# Patient Record
Sex: FEMALE | Employment: FULL TIME | URBAN - METROPOLITAN AREA
[De-identification: names, ages, dates, MRNs, and addresses within clinical notes are randomized per-mention and may not be internally consistent; named-entity substitution may affect disease eponyms.]

---

## 2017-02-06 PROCEDURE — 87591 N.GONORRHOEAE DNA AMP PROB: CPT

## 2017-02-06 PROCEDURE — 87491 CHLMYD TRACH DNA AMP PROBE: CPT

## 2017-02-07 ENCOUNTER — APPOINTMENT (OUTPATIENT)
Dept: LAB | Age: 22
End: 2017-02-07
Attending: OBSTETRICS & GYNECOLOGY

## 2017-02-07 DIAGNOSIS — Z11.3 SCREENING FOR VENEREAL DISEASE: ICD-10-CM

## 2017-02-08 LAB
C TRACH DNA SPEC QL NAA+PROBE: NEGATIVE
N GONORRHOEA DNA SPEC QL NAA+PROBE: NEGATIVE

## 2017-03-14 ENCOUNTER — TELEPHONE (OUTPATIENT)
Dept: OBGYN CLINIC | Facility: CLINIC | Age: 22
End: 2017-03-14

## 2017-03-14 DIAGNOSIS — N92.1 MENORRHAGIA WITH IRREGULAR CYCLE: ICD-10-CM

## 2017-03-14 DIAGNOSIS — E05.90 HYPERTHYROIDISM: Primary | ICD-10-CM

## 2017-03-15 ENCOUNTER — LAB ENCOUNTER (OUTPATIENT)
Dept: LAB | Age: 22
End: 2017-03-15
Attending: OBSTETRICS & GYNECOLOGY
Payer: COMMERCIAL

## 2017-03-15 DIAGNOSIS — N92.1 MENORRHAGIA WITH IRREGULAR CYCLE: ICD-10-CM

## 2017-03-15 DIAGNOSIS — E05.90 HYPERTHYROIDISM: ICD-10-CM

## 2017-03-15 LAB
BASOPHILS # BLD AUTO: 0.05 X10(3) UL (ref 0–0.1)
BASOPHILS NFR BLD AUTO: 0.9 %
EOSINOPHIL # BLD AUTO: 0.15 X10(3) UL (ref 0–0.3)
EOSINOPHIL NFR BLD AUTO: 2.8 %
ERYTHROCYTE [DISTWIDTH] IN BLOOD BY AUTOMATED COUNT: 12.1 % (ref 11.5–16)
HCT VFR BLD AUTO: 36.9 % (ref 34–50)
HGB BLD-MCNC: 12.4 G/DL (ref 12–16)
IMMATURE GRANULOCYTE COUNT: 0.01 X10(3) UL (ref 0–1)
IMMATURE GRANULOCYTE RATIO %: 0.2 %
LYMPHOCYTES # BLD AUTO: 1.74 X10(3) UL (ref 0.9–4)
LYMPHOCYTES NFR BLD AUTO: 32.2 %
MCH RBC QN AUTO: 30.8 PG (ref 27–33.2)
MCHC RBC AUTO-ENTMCNC: 33.6 G/DL (ref 31–37)
MCV RBC AUTO: 91.6 FL (ref 81–100)
MONOCYTES # BLD AUTO: 0.48 X10(3) UL (ref 0.1–0.6)
MONOCYTES NFR BLD AUTO: 8.9 %
NEUTROPHIL ABS PRELIM: 2.97 X10 (3) UL (ref 1.3–6.7)
NEUTROPHILS # BLD AUTO: 2.97 X10(3) UL (ref 1.3–6.7)
NEUTROPHILS NFR BLD AUTO: 55 %
PLATELET # BLD AUTO: 317 10(3)UL (ref 150–450)
RBC # BLD AUTO: 4.03 X10(6)UL (ref 3.8–5.1)
RED CELL DISTRIBUTION WIDTH-SD: 40.7 FL (ref 35.1–46.3)
TSI SER-ACNC: 0.6 MIU/ML (ref 0.35–5.5)
WBC # BLD AUTO: 5.4 X10(3) UL (ref 4–13)

## 2017-03-15 PROCEDURE — 84443 ASSAY THYROID STIM HORMONE: CPT

## 2017-03-15 PROCEDURE — 85025 COMPLETE CBC W/AUTO DIFF WBC: CPT

## 2017-05-24 ENCOUNTER — OFFICE VISIT (OUTPATIENT)
Dept: NEUROLOGY | Facility: CLINIC | Age: 22
End: 2017-05-24

## 2017-05-24 VITALS
HEIGHT: 69 IN | RESPIRATION RATE: 16 BRPM | BODY MASS INDEX: 20.73 KG/M2 | WEIGHT: 140 LBS | SYSTOLIC BLOOD PRESSURE: 96 MMHG | DIASTOLIC BLOOD PRESSURE: 64 MMHG | HEART RATE: 65 BPM

## 2017-05-24 DIAGNOSIS — G43.009 MIGRAINE WITHOUT AURA, NOT INTRACTABLE, WITHOUT STATUS MIGRAINOSUS: Primary | ICD-10-CM

## 2017-05-24 PROCEDURE — 99205 OFFICE O/P NEW HI 60 MIN: CPT | Performed by: OTHER

## 2017-05-24 RX ORDER — RANITIDINE 150 MG/1
1 TABLET ORAL DAILY
Refills: 3 | COMMUNITY
Start: 2017-05-12 | End: 2017-07-11

## 2017-05-24 RX ORDER — FROVATRIPTAN SUCCINATE 2.5 MG/1
2.5 TABLET, FILM COATED ORAL AS NEEDED
Qty: 12 TABLET | Refills: 0 | Status: SHIPPED | OUTPATIENT
Start: 2017-05-24 | End: 2017-06-23

## 2017-05-24 RX ORDER — NORETHINDRONE AND ETHINYL ESTRADIOL 1 MG-35MCG
1 KIT ORAL DAILY
Refills: 4 | COMMUNITY
Start: 2017-05-10 | End: 2017-11-28

## 2017-05-24 RX ORDER — DICYCLOMINE HCL 20 MG
1 TABLET ORAL DAILY
Refills: 3 | COMMUNITY
Start: 2017-05-04 | End: 2017-07-11

## 2017-05-24 RX ORDER — NORTRIPTYLINE HYDROCHLORIDE 25 MG/1
25 CAPSULE ORAL NIGHTLY
Qty: 30 CAPSULE | Refills: 1 | Status: SHIPPED | OUTPATIENT
Start: 2017-05-24 | End: 2017-07-08

## 2017-05-24 RX ORDER — IBUPROFEN 200 MG
200 TABLET ORAL EVERY 6 HOURS PRN
COMMUNITY
End: 2019-02-01 | Stop reason: ALTCHOICE

## 2017-05-24 NOTE — PATIENT INSTRUCTIONS
Refill policies:    • Allow 2-3 business days for refills; controlled substances may take longer.   • Contact your pharmacy at least 5 days prior to running out of medication and have them send an electronic request or submit request through the [facility] have a procedure or additional testing performed. [facility]) will contact your insurance carrier to obtain pre-certification or prior authorization.     Unfortunately, [facility] has seen an increase in denial of payment even though the p

## 2017-05-24 NOTE — PROGRESS NOTES
Neurology H&P    Al Soares Patient Status:  No patient class for patient encounter    1995 MRN [id number]   Location [address] [facility], [address] [place] [address] Drive, [address] [facility] Attending No att. providers found   [facility] Day #  PCP Pamela Patel for Pain. Disp:  Rfl:        Problem List:  There is no problem list on file for this patient.       PMHx:  Past Medical History   Diagnosis Date   • Migraines    • GERD (gastroesophageal reflux disease)    • Seasonal allergies        PSHx:  No past surgica strength throughout in UEs and LEs     SENSORY EXAMINATION:  UE: intact to light touch, pinprick is intact  LE: intact to light touch, pinprick is intact    COORDINATION:  No dysmetria, or intention tremors; normal MONA    REFLEXES: 2+ at biceps, 2+ triceps

## 2017-06-01 ENCOUNTER — HOSPITAL ENCOUNTER (OUTPATIENT)
Dept: MRI IMAGING | Facility: HOSPITAL | Age: 22
Discharge: HOME | End: 2017-06-01
Attending: STUDENT IN AN ORGANIZED HEALTH CARE EDUCATION/TRAINING PROGRAM
Payer: COMMERCIAL

## 2017-06-01 DIAGNOSIS — K59.00 CONSTIPATION, UNSPECIFIED CONSTIPATION TYPE: ICD-10-CM

## 2017-06-01 DIAGNOSIS — R10.84 ABDOMINAL PAIN, GENERALIZED: ICD-10-CM

## 2017-06-01 PROCEDURE — A9575 INJ GADOTERATE MEGLUMI 0.1ML: HCPCS | Performed by: STUDENT IN AN ORGANIZED HEALTH CARE EDUCATION/TRAINING PROGRAM

## 2017-06-01 PROCEDURE — 74183 MRI ABD W/O CNTR FLWD CNTR: CPT | Performed by: STUDENT IN AN ORGANIZED HEALTH CARE EDUCATION/TRAINING PROGRAM

## 2017-06-01 PROCEDURE — 72197 MRI PELVIS W/O & W/DYE: CPT | Performed by: STUDENT IN AN ORGANIZED HEALTH CARE EDUCATION/TRAINING PROGRAM

## 2017-06-12 ENCOUNTER — TELEPHONE (OUTPATIENT)
Dept: NEUROLOGY | Facility: CLINIC | Age: 22
End: 2017-06-12

## 2017-06-12 NOTE — TELEPHONE ENCOUNTER
Pts mother calling (OK per HIPAA) to state that the Frovatriptan ordered at [address] [place] [address] on 5/30/17 was never picked up because the [facility] AND [facility] cost is too expensive. She is requesting a different medication be ordered.   Confirmed preferred pharmacy of [facility]

## 2017-06-13 NOTE — TELEPHONE ENCOUNTER
I called Ms. Azar Martini on the phone regarding her Frovatriptan. She did not answer and I left a message on her personal VM to call back to clinic with questions.  She can continue to take sumatriptan for now if this is helping with her migraines and do not f

## 2017-07-08 DIAGNOSIS — G43.009 MIGRAINE WITHOUT AURA, NOT INTRACTABLE, WITHOUT STATUS MIGRAINOSUS: ICD-10-CM

## 2017-07-10 RX ORDER — NORTRIPTYLINE HYDROCHLORIDE 25 MG/1
CAPSULE ORAL
Qty: 30 CAPSULE | Refills: 0 | Status: SHIPPED | OUTPATIENT
Start: 2017-07-10 | End: 2017-10-16

## 2017-07-10 NOTE — TELEPHONE ENCOUNTER
Left message on patient identified voicemail (ok with HIPPA consent) informing patient she is due for follow up end of this month and only 30 day supply will be refilled until she is seen.    Medication: Pamelor    Date of last refill: 5/24/17  Date last fi

## 2017-08-23 ENCOUNTER — TELEPHONE (OUTPATIENT)
Dept: NEUROLOGY | Facility: CLINIC | Age: 22
End: 2017-08-23

## 2017-08-23 RX ORDER — SUMATRIPTAN 25 MG/1
25 TABLET, FILM COATED ORAL EVERY 2 HOUR PRN
Qty: 9 TABLET | Refills: 0 | Status: SHIPPED | OUTPATIENT
Start: 2017-08-23 | End: 2017-09-15

## 2017-08-23 NOTE — TELEPHONE ENCOUNTER
Sumatriptan sent to pharmacy. We can discuss further prophylactic medications at her next visit if she would like.

## 2017-08-23 NOTE — TELEPHONE ENCOUNTER
Per Dr Angel Pina 5/24/17:   Assessment:   This is a 24 y/o female with 4 years of migraine type headaches, I will start Nortriptyline 25mg PO QHS for prophylaxis and Frovatriptan for breakthrough headache especially as she notes longer headache around her me

## 2017-08-23 NOTE — TELEPHONE ENCOUNTER
Pt returning call. Relayed message from Dr. Radha Root. Educated patient on use of Imitrex and MOH. Pt verbalized understanding, agrees to plan, and expresses intent to comply with recommendations given. Pt agreeable to scheduling a f/u appt.   Accepted

## 2017-08-23 NOTE — TELEPHONE ENCOUNTER
Attempted to reach patient. Unable to leave a message at this time. When returns call please help patient schedule f/u appt with Dr Stephanie Loyd and relay recommendations below. Medication sent to pharmacy with receipt confirmation.

## 2017-08-23 NOTE — TELEPHONE ENCOUNTER
Spoke with patient. She states she has been having headaches everyday and migraines once a week. Patient stated Nortriptyline 25 mg is not helping and she  stopped taking it a week ago. She also stated Frovatriptan does not work.      Patient said she t

## 2017-09-07 ENCOUNTER — TELEPHONE (OUTPATIENT)
Dept: NEUROLOGY | Facility: CLINIC | Age: 22
End: 2017-09-07

## 2017-09-15 DIAGNOSIS — G43.009 MIGRAINE WITHOUT AURA, NOT INTRACTABLE, WITHOUT STATUS MIGRAINOSUS: Primary | ICD-10-CM

## 2017-09-15 RX ORDER — SUMATRIPTAN 25 MG/1
TABLET, FILM COATED ORAL
Qty: 9 TABLET | Refills: 0 | Status: SHIPPED | OUTPATIENT
Start: 2017-09-15 | End: 2017-10-16

## 2017-09-15 NOTE — TELEPHONE ENCOUNTER
Medication: Imitrex    Date of last refill: 8/23/17 for #9/0 additional refills  [place]    Last office visit: 5/24/17  Due back to clinic per last office note:  2 months  Date next office visit scheduled:  10/16/17

## 2017-10-16 ENCOUNTER — APPOINTMENT (OUTPATIENT)
Dept: LAB | Facility: HOSPITAL | Age: 22
End: 2017-10-16
Attending: Other
Payer: COMMERCIAL

## 2017-10-16 ENCOUNTER — OFFICE VISIT (OUTPATIENT)
Dept: NEUROLOGY | Facility: CLINIC | Age: 22
End: 2017-10-16

## 2017-10-16 VITALS
DIASTOLIC BLOOD PRESSURE: 58 MMHG | SYSTOLIC BLOOD PRESSURE: 112 MMHG | RESPIRATION RATE: 16 BRPM | HEART RATE: 80 BPM | BODY MASS INDEX: 21 KG/M2 | WEIGHT: 144 LBS

## 2017-10-16 DIAGNOSIS — F41.9 ANXIETY: ICD-10-CM

## 2017-10-16 DIAGNOSIS — G43.009 MIGRAINE WITHOUT AURA, NOT INTRACTABLE, WITHOUT STATUS MIGRAINOSUS: ICD-10-CM

## 2017-10-16 DIAGNOSIS — G43.009 MIGRAINE WITHOUT AURA, NOT INTRACTABLE, WITHOUT STATUS MIGRAINOSUS: Primary | ICD-10-CM

## 2017-10-16 PROCEDURE — 85027 COMPLETE CBC AUTOMATED: CPT

## 2017-10-16 PROCEDURE — 99214 OFFICE O/P EST MOD 30 MIN: CPT | Performed by: OTHER

## 2017-10-16 PROCEDURE — 80053 COMPREHEN METABOLIC PANEL: CPT

## 2017-10-16 PROCEDURE — 36415 COLL VENOUS BLD VENIPUNCTURE: CPT

## 2017-10-16 RX ORDER — VENLAFAXINE 37.5 MG/1
37.5 TABLET ORAL DAILY
Qty: 30 TABLET | Refills: 0 | Status: SHIPPED | OUTPATIENT
Start: 2017-10-16 | End: 2017-11-29

## 2017-10-16 RX ORDER — VENLAFAXINE 37.5 MG/1
TABLET ORAL
Qty: 90 TABLET | Refills: 0 | OUTPATIENT
Start: 2017-10-16

## 2017-10-16 RX ORDER — SUMATRIPTAN 100 MG/1
50 TABLET, FILM COATED ORAL EVERY 2 HOUR PRN
Qty: 9 TABLET | Refills: 0 | Status: SHIPPED | OUTPATIENT
Start: 2017-10-16 | End: 2018-01-15

## 2017-10-16 NOTE — PROGRESS NOTES
Neurology H&P    Mariya Velasco Patient Status:  No patient class for patient encounter    1995 MRN [id number]   Location [address] [place] [address], [address] [place] [address] Drive, [address] [facility] Attending No att. providers found   [facility] Day # 0 PCP No primary care pr migraine headaches. She was only prescribed 25mg but states that it did not significantly help so started taking her mothers medications. She was cautioned against using other peoples medications today. She takes APAP 2-3 times per week.  She feels that The Reynaldo loss  Vision: no vision changes, no new blurry or double vision  Head and Neck: no eye or ear discharge  Pulmonary: no SOB, no new cough  CV: no chest pain, no new lower extremity swelling  GI: no constipation or abdominal pain  : denies frequent urinati as it may interfere with her OCP. She also declines further trial of a TCA. I weill try a low dose of venlafaxine for both her anxiety and headaches.  We discussed possible side effects in detail and the dangers of becoming pregnant on this medication and s

## 2017-10-16 NOTE — PATIENT INSTRUCTIONS
Refill policies:    • Allow 2-3 business days for refills; controlled substances may take longer.   • Contact your pharmacy at least 5 days prior to running out of medication and have them send an electronic request or submit request through the [facility] have a procedure or additional testing performed. [facility]) will contact your insurance carrier to obtain pre-certification or prior authorization.     Unfortunately, [facility] has seen an increase in denial of payment even though the p

## 2017-10-16 NOTE — PROGRESS NOTES
Patient states she started taking her mothers Nortriptyline 100 mg for the past month because the 25 mg prescribed was not helping.  Educated patient on taking medications prescribed specially for her and to inform the office if she feels she needs a dose a

## 2017-10-17 PROBLEM — F41.9 ANXIETY: Status: ACTIVE | Noted: 2017-10-17

## 2017-11-01 ENCOUNTER — TELEPHONE (OUTPATIENT)
Dept: NEUROLOGY | Facility: CLINIC | Age: 22
End: 2017-11-01

## 2017-11-01 NOTE — TELEPHONE ENCOUNTER
Patient calling with condition update after starting Venlafaxine. Started this morning, first dose at 1AM. Did eat prior but at 11PM. States she woke up every 30 minutes feeling very nauseous. Still feeling a little nauseous today.  Will get back to pat

## 2017-11-01 NOTE — TELEPHONE ENCOUNTER
Relayed Dr. Tigist Beal recommendations. Verbalized understanding. No further questions at this time.

## 2017-11-28 ENCOUNTER — OFFICE VISIT (OUTPATIENT)
Dept: OBGYN CLINIC | Facility: CLINIC | Age: 22
End: 2017-11-28

## 2017-11-28 VITALS
HEIGHT: 69 IN | BODY MASS INDEX: 21.18 KG/M2 | SYSTOLIC BLOOD PRESSURE: 102 MMHG | DIASTOLIC BLOOD PRESSURE: 52 MMHG | HEART RATE: 80 BPM | WEIGHT: 143 LBS

## 2017-11-28 DIAGNOSIS — Z01.419 ENCOUNTER FOR WELL WOMAN EXAM WITH ROUTINE GYNECOLOGICAL EXAM: Primary | ICD-10-CM

## 2017-11-28 PROCEDURE — 87591 N.GONORRHOEAE DNA AMP PROB: CPT | Performed by: OBSTETRICS & GYNECOLOGY

## 2017-11-28 PROCEDURE — 87491 CHLMYD TRACH DNA AMP PROBE: CPT | Performed by: OBSTETRICS & GYNECOLOGY

## 2017-11-28 PROCEDURE — 99395 PREV VISIT EST AGE 18-39: CPT | Performed by: OBSTETRICS & GYNECOLOGY

## 2017-11-28 RX ORDER — NORETHINDRONE AND ETHINYL ESTRADIOL 1 MG-35MCG
1 KIT ORAL DAILY
Qty: 3 PACKAGE | Refills: 3 | Status: SHIPPED | OUTPATIENT
Start: 2017-11-28 | End: 2019-02-01 | Stop reason: ALTCHOICE

## 2017-11-28 NOTE — PROGRESS NOTES
Isis Fermin is a 23year old female [facility] Patient's last menstrual period was 11/16/2017 (exact date).  Patient presents with:  Wellness Visit: pt states urine has a foul odor  Patient has no complaints, sexually active    OBSTETRICS HISTORY:  OB Sodium 10 MG Oral Tab, Take 1 tablet (10 mg total) by mouth nightly.  1 tab po qd, Disp: 30 tablet, Rfl: 11  •  Albuterol Sulfate HFA (PROAIR HFA) 108 (90 Base) MCG/ACT Inhalation Aero Soln, 1-2 puffs every 4-6 hours as needed, Disp: 1 Inhaler, Rfl: 6  •  i abnormal discharge  Cervix:  Normal without tenderness on motion  Uterus: normal in size, contour, position, mobility, without tenderness  Adnexa: normal without masses or tenderness  Perineum: normal  Anus: no hemorroids     Assessment & Plan:  Diagnoses

## 2017-11-29 DIAGNOSIS — G43.009 MIGRAINE WITHOUT AURA, NOT INTRACTABLE, WITHOUT STATUS MIGRAINOSUS: ICD-10-CM

## 2017-11-29 RX ORDER — VENLAFAXINE 37.5 MG/1
TABLET ORAL
Qty: 30 TABLET | Refills: 1 | Status: SHIPPED | OUTPATIENT
Start: 2017-11-29 | End: 2018-01-15

## 2017-11-29 NOTE — TELEPHONE ENCOUNTER
Medication: Effexor 37.5mg     Date of last refill: 10/16/17  [place]     Last office visit: 10/16/17  Due back to clinic per last office note:  4 weeks  Date next office visit scheduled:  1/15/18    Last OV note recomme

## 2017-12-01 ENCOUNTER — TELEPHONE (OUTPATIENT)
Dept: NEUROLOGY | Facility: CLINIC | Age: 22
End: 2017-12-01

## 2018-01-15 ENCOUNTER — OFFICE VISIT (OUTPATIENT)
Dept: NEUROLOGY | Facility: CLINIC | Age: 23
End: 2018-01-15

## 2018-01-15 VITALS
DIASTOLIC BLOOD PRESSURE: 56 MMHG | SYSTOLIC BLOOD PRESSURE: 102 MMHG | WEIGHT: 143 LBS | BODY MASS INDEX: 21.18 KG/M2 | HEIGHT: 69 IN | RESPIRATION RATE: 16 BRPM | HEART RATE: 90 BPM

## 2018-01-15 DIAGNOSIS — G43.009 MIGRAINE WITHOUT AURA, NOT INTRACTABLE, WITHOUT STATUS MIGRAINOSUS: Primary | ICD-10-CM

## 2018-01-15 DIAGNOSIS — F41.9 ANXIETY: ICD-10-CM

## 2018-01-15 PROCEDURE — 99213 OFFICE O/P EST LOW 20 MIN: CPT | Performed by: OTHER

## 2018-01-15 RX ORDER — VENLAFAXINE 37.5 MG/1
37.5 TABLET ORAL
Qty: 30 TABLET | Refills: 6 | Status: SHIPPED | OUTPATIENT
Start: 2018-01-15 | End: 2018-09-09

## 2018-01-15 RX ORDER — SUMATRIPTAN 100 MG/1
50 TABLET, FILM COATED ORAL EVERY 2 HOUR PRN
Qty: 9 TABLET | Refills: 0 | Status: SHIPPED | OUTPATIENT
Start: 2018-01-15 | End: 2018-09-25

## 2018-01-15 NOTE — PROGRESS NOTES
Neurology H&P    Almetta Buerger Patient Status:  No patient class for patient encounter    1995 MRN [id number]   Location ED [facility], [address], [address] [facility] [address] Attending No att. providers found   [facility] Day # 0 PCP No primary care pr completely relieved her headaches. She reports less anxiety now as well. She does note that she stopped using her perfume and this helped significantly. She also notes more headaches if she sleeps in an odd position.     Current Medications:    Current Outp no eye or ear discharge  Pulmonary: no SOB, no new cough  CV: no chest pain, no new lower extremity swelling  GI: no constipation or abdominal pain  : denies frequent urination or difficulty urinating   Heme: no new bruising, no unexplained fevers or chi control. Plan:  1. Headache - Migraine w/o maris and medication overuse headache  - She is hesitant to start a medication such as Verapamil or propranolol due to low BP concerns for dizziness etc  - Continue Venlafaxine 37.5mg.  She was explicitly warned

## 2018-01-15 NOTE — PATIENT INSTRUCTIONS
Refill policies:    • Allow 2-3 business days for refills; controlled substances may take longer.   • Contact your pharmacy at least 5 days prior to running out of medication and have them send an electronic request or submit request through the [facility] recommended that you have a procedure or additional testing performed. [facility]) will contact your insurance carrier to obtain pre-certification or prior authorization.     Unfortunately, [facility] has seen an increase in denial of paym

## 2018-09-09 DIAGNOSIS — G43.009 MIGRAINE WITHOUT AURA, NOT INTRACTABLE, WITHOUT STATUS MIGRAINOSUS: ICD-10-CM

## 2018-09-09 DIAGNOSIS — F41.9 ANXIETY: ICD-10-CM

## 2018-09-11 NOTE — TELEPHONE ENCOUNTER
Left message on patient identified voicemail (ok with HIPPA consent) informing patient  Needs appointment for further refills.   .  Medication: Venlafaxine    Date of last refill: 1/15/18 (#30/6)  [place]    Last offic

## 2018-09-11 NOTE — TELEPHONE ENCOUNTER
Patient called and made appointment with Dr. Ector Clay on 9/25/18. She was informed that the medication would only be filled up until that date. She is completely out of the medication.

## 2018-09-12 RX ORDER — VENLAFAXINE 37.5 MG/1
TABLET ORAL
Qty: 30 TABLET | Refills: 0 | Status: SHIPPED | OUTPATIENT
Start: 2018-09-12 | End: 2018-09-25

## 2018-09-25 ENCOUNTER — OFFICE VISIT (OUTPATIENT)
Dept: NEUROLOGY | Facility: CLINIC | Age: 23
End: 2018-09-25
Payer: COMMERCIAL

## 2018-09-25 VITALS
SYSTOLIC BLOOD PRESSURE: 90 MMHG | BODY MASS INDEX: 21 KG/M2 | HEART RATE: 64 BPM | WEIGHT: 144 LBS | DIASTOLIC BLOOD PRESSURE: 70 MMHG

## 2018-09-25 DIAGNOSIS — G43.009 MIGRAINE WITHOUT AURA, NOT INTRACTABLE, WITHOUT STATUS MIGRAINOSUS: Primary | ICD-10-CM

## 2018-09-25 DIAGNOSIS — F41.9 ANXIETY: ICD-10-CM

## 2018-09-25 PROCEDURE — 99213 OFFICE O/P EST LOW 20 MIN: CPT | Performed by: OTHER

## 2018-09-25 RX ORDER — VENLAFAXINE 37.5 MG/1
TABLET ORAL
Qty: 90 TABLET | Refills: 2 | Status: SHIPPED | OUTPATIENT
Start: 2018-09-25 | End: 2019-07-10

## 2018-09-25 RX ORDER — SUMATRIPTAN 100 MG/1
50 TABLET, FILM COATED ORAL EVERY 2 HOUR PRN
Qty: 9 TABLET | Refills: 3 | Status: SHIPPED | OUTPATIENT
Start: 2018-09-25

## 2018-09-25 NOTE — PATIENT INSTRUCTIONS
Refill policies:    • Allow 2-3 business days for refills; controlled substances may take longer.   • Contact your pharmacy at least 5 days prior to running out of medication and have them send an electronic request or submit request through the “request re entire amount billed. Precertification and Prior Authorizations: If your physician has recommended that you have a procedure or additional testing performed.   [facility]) will contact your insurance carrier to obtain pre-certi

## 2018-09-25 NOTE — PROGRESS NOTES
Neurology H&P    Violet Smith Patient Status:  No patient class for patient encounter    1995 MRN [id number]   Location [facility], [address], [address] Attending No att. providers found   [facility] Day # 0 PCP No primary care pr sumatriptan but otherwise feels that headaches are under very good control. Current Medications:    Current Outpatient Medications:  Multiple Vitamins-Minerals (MULTIVITAMIN ADULT OR) Take by mouth daily.  Disp:  Rfl:    VENLAFAXINE HCL 37.5 MG Oral Tab difficulty urinating   Heme: no new bruising, no unexplained fevers or chills  Endocrine: no unexplained weight loss or gain, no new fatigue  Musculoskeletal: denies back pain, denies joint pain  Psych: denies depression   Skin: denies any new masses, rash warned about risk of venlafaxine in pregnancy.        - We discussed possible side effects in detail      - Counseled to take OTC analgesics no more than 2-3 days per week  - Exercise 3 times per week at least 30 minutes  - Caffeine only in moderation  - En

## 2018-12-23 RX ORDER — NORETHINDRONE AND ETHINYL ESTRADIOL 1 MG-35MCG
KIT ORAL
Qty: 84 TABLET | Refills: 0 | OUTPATIENT
Start: 2018-12-23

## 2019-02-01 ENCOUNTER — OFFICE VISIT (OUTPATIENT)
Dept: OBGYN CLINIC | Facility: CLINIC | Age: 24
End: 2019-02-01
Payer: COMMERCIAL

## 2019-02-01 VITALS
HEART RATE: 94 BPM | RESPIRATION RATE: 16 BRPM | WEIGHT: 148 LBS | HEIGHT: 69 IN | SYSTOLIC BLOOD PRESSURE: 100 MMHG | BODY MASS INDEX: 21.92 KG/M2 | DIASTOLIC BLOOD PRESSURE: 60 MMHG

## 2019-02-01 DIAGNOSIS — N92.3 INTERMENSTRUAL SPOTTING: ICD-10-CM

## 2019-02-01 DIAGNOSIS — Z12.4 CERVICAL CANCER SCREENING: ICD-10-CM

## 2019-02-01 DIAGNOSIS — Z01.419 ENCOUNTER FOR WELL WOMAN EXAM WITH ROUTINE GYNECOLOGICAL EXAM: Primary | ICD-10-CM

## 2019-02-01 PROCEDURE — 87491 CHLMYD TRACH DNA AMP PROBE: CPT | Performed by: OBSTETRICS & GYNECOLOGY

## 2019-02-01 PROCEDURE — 87591 N.GONORRHOEAE DNA AMP PROB: CPT | Performed by: OBSTETRICS & GYNECOLOGY

## 2019-02-01 PROCEDURE — 87480 CANDIDA DNA DIR PROBE: CPT | Performed by: OBSTETRICS & GYNECOLOGY

## 2019-02-01 PROCEDURE — 87660 TRICHOMONAS VAGIN DIR PROBE: CPT | Performed by: OBSTETRICS & GYNECOLOGY

## 2019-02-01 PROCEDURE — 88175 CYTOPATH C/V AUTO FLUID REDO: CPT | Performed by: OBSTETRICS & GYNECOLOGY

## 2019-02-01 PROCEDURE — 87510 GARDNER VAG DNA DIR PROBE: CPT | Performed by: OBSTETRICS & GYNECOLOGY

## 2019-02-01 PROCEDURE — 99395 PREV VISIT EST AGE 18-39: CPT | Performed by: OBSTETRICS & GYNECOLOGY

## 2019-02-01 RX ORDER — NORETHINDRONE ACETATE AND ETHINYL ESTRADIOL 1MG-20(21)
1 KIT ORAL DAILY
Qty: 3 PACKAGE | Refills: 3 | Status: SHIPPED | OUTPATIENT
Start: 2019-02-01 | End: 2020-02-04

## 2019-02-01 NOTE — PROGRESS NOTES
Ana Rosa Rodriguez is a 26year old female [facility] Patient's last menstrual period was 01/25/2019 (approximate). Patient presents with:  Wellness Visit  .   Patient c/o spotting after sex, would like to go back on OCP  OBSTETRICS HISTORY:  OB History   Gra Not Asked    Social History Narrative      Not on file      FAMILY HISTORY:  Family History   Problem Relation Age of Onset   • Cancer Mother 39        brain cancer   • Migraines Mother    • Anxiety Mother    • Cancer Maternal Grandfather 79        lung normal without palpable masses, tenderness, asymmetry, nipple discharge, nipple retraction or skin changes  Abdomen:  soft, nontender, nondistended, no masses  Skin/Hair: no unusual rashes or bruises  Extremities: no edema, no cyanosis  Psychiatric:  Aric Christie

## 2019-02-03 LAB
C TRACH DNA SPEC QL NAA+PROBE: NEGATIVE
N GONORRHOEA DNA SPEC QL NAA+PROBE: NEGATIVE

## 2019-07-10 ENCOUNTER — TELEPHONE (OUTPATIENT)
Dept: NEUROLOGY | Facility: CLINIC | Age: 24
End: 2019-07-10

## 2019-07-10 DIAGNOSIS — F41.9 ANXIETY: ICD-10-CM

## 2019-07-10 DIAGNOSIS — G43.009 MIGRAINE WITHOUT AURA, NOT INTRACTABLE, WITHOUT STATUS MIGRAINOSUS: ICD-10-CM

## 2019-07-10 RX ORDER — VENLAFAXINE 37.5 MG/1
TABLET ORAL
Qty: 30 TABLET | Refills: 0 | Status: SHIPPED | OUTPATIENT
Start: 2019-07-10 | End: 2019-08-12

## 2019-07-10 NOTE — TELEPHONE ENCOUNTER
Message left on voicemail that she needs to see Dr King Moreira in clinic before any future refills will be approved. Office phone # given.

## 2019-07-10 NOTE — TELEPHONE ENCOUNTER
Medication: VENLAFAXINE HCL 37.5 MG Oral Tab    Date of last refill: 09/25/18 (#90/2)  [place]    Last office visit: 09/25/18  Due back to clinic per last office note:  Around 05/25/19  Date next office visit schedul

## 2019-08-12 DIAGNOSIS — F41.9 ANXIETY: ICD-10-CM

## 2019-08-12 DIAGNOSIS — G43.009 MIGRAINE WITHOUT AURA, NOT INTRACTABLE, WITHOUT STATUS MIGRAINOSUS: ICD-10-CM

## 2019-08-12 NOTE — TELEPHONE ENCOUNTER
Medication: Venlafaxine     Date of last refill: 7/10/19 for #30/0 additional refills   [place]    Last office visit: 9/25/18  Due back to clinic per last office note:  8 months  Date next office visit scheduled:  9/

## 2019-08-13 RX ORDER — VENLAFAXINE 37.5 MG/1
TABLET ORAL
Qty: 30 TABLET | Refills: 0 | Status: SHIPPED | OUTPATIENT
Start: 2019-08-13 | End: 2019-09-11

## 2019-09-09 DIAGNOSIS — G43.009 MIGRAINE WITHOUT AURA, NOT INTRACTABLE, WITHOUT STATUS MIGRAINOSUS: ICD-10-CM

## 2019-09-09 DIAGNOSIS — F41.9 ANXIETY: ICD-10-CM

## 2019-09-10 RX ORDER — VENLAFAXINE 37.5 MG/1
TABLET ORAL
Qty: 30 TABLET | Refills: 0 | OUTPATIENT
Start: 2019-09-10

## 2019-09-11 RX ORDER — VENLAFAXINE 37.5 MG/1
TABLET ORAL
Qty: 30 TABLET | Refills: 0 | Status: SHIPPED | OUTPATIENT
Start: 2019-09-11 | End: 2019-10-15

## 2019-10-10 DIAGNOSIS — G43.009 MIGRAINE WITHOUT AURA, NOT INTRACTABLE, WITHOUT STATUS MIGRAINOSUS: ICD-10-CM

## 2019-10-10 DIAGNOSIS — F41.9 ANXIETY: ICD-10-CM

## 2019-10-11 NOTE — TELEPHONE ENCOUNTER
Pt hasn't been seen in over 1 year. Pt had appts scheduled for 9/13/19 and 9/17/19 but both were canceled. LMTCB for patient indicating appt is needed before we can refill up to appt date.     Medication: VENLAFAXINE HCL 37.5 MG Oral Tab    Date of last r

## 2019-10-14 RX ORDER — VENLAFAXINE 37.5 MG/1
TABLET ORAL
Qty: 30 TABLET | Refills: 1 | OUTPATIENT
Start: 2019-10-14

## 2019-10-15 RX ORDER — VENLAFAXINE 37.5 MG/1
TABLET ORAL
Qty: 50 TABLET | Refills: 0 | Status: SHIPPED | OUTPATIENT
Start: 2019-10-15 | End: 2019-12-03

## 2019-12-03 ENCOUNTER — OFFICE VISIT (OUTPATIENT)
Dept: NEUROLOGY | Facility: CLINIC | Age: 24
End: 2019-12-03
Payer: COMMERCIAL

## 2019-12-03 VITALS
BODY MASS INDEX: 21 KG/M2 | SYSTOLIC BLOOD PRESSURE: 116 MMHG | WEIGHT: 145 LBS | RESPIRATION RATE: 16 BRPM | DIASTOLIC BLOOD PRESSURE: 60 MMHG | HEART RATE: 70 BPM

## 2019-12-03 DIAGNOSIS — F41.9 ANXIETY: ICD-10-CM

## 2019-12-03 DIAGNOSIS — G43.009 MIGRAINE WITHOUT AURA, NOT INTRACTABLE, WITHOUT STATUS MIGRAINOSUS: Primary | ICD-10-CM

## 2019-12-03 PROCEDURE — 99213 OFFICE O/P EST LOW 20 MIN: CPT | Performed by: OTHER

## 2019-12-03 RX ORDER — VENLAFAXINE 25 MG/1
TABLET ORAL
Qty: 12 TABLET | Refills: 0 | Status: SHIPPED | OUTPATIENT
Start: 2019-12-03 | End: 2020-01-07

## 2019-12-03 NOTE — PROGRESS NOTES
Neurology H&P    Mariya Velasco Patient Status:  No patient class for patient encounter    1995 MRN [id number]   Location [facility], [address] [place] [address], [address] [place] [address] Attending No att. providers found   Hosp Day # 0 PCP None Pcp     Michelle Blake tell me that when she has ran out she stated to feel nauseated and not well. Current Medications:  Current Outpatient Medications   Medication Sig Dispense Refill   • Venlafaxine HCl 37.5 MG Oral Tab TAKE 1 TABLET BY MOUTH EVERY DAY.  There will no more bruising    Objective/Physical Exam:    Vital Signs:  Blood pressure 116/60, pulse 70, resp. rate 16, weight 145 lb (65.8 kg), not currently breastfeeding.     Gen: Awake and in no apparent distress  HEENT: moist mucus membranes  Neck: Supple  Cardiovascula moderation  - Encouraged proper hydration 6-8 glasses H2O per day  - Keep a headache diary to bring to next clinic appointment  - Encouraged proper sleep hygiene   - Keep note of possible food triggers  (ripe cheeses, MSG etc.)  - Limit EtOH consumption (n

## 2019-12-04 ENCOUNTER — TELEPHONE (OUTPATIENT)
Dept: NEUROLOGY | Facility: CLINIC | Age: 24
End: 2019-12-04

## 2020-01-07 ENCOUNTER — OFFICE VISIT (OUTPATIENT)
Dept: OBGYN CLINIC | Facility: CLINIC | Age: 25
End: 2020-01-07
Payer: COMMERCIAL

## 2020-01-07 VITALS
HEART RATE: 84 BPM | SYSTOLIC BLOOD PRESSURE: 110 MMHG | HEIGHT: 69 IN | BODY MASS INDEX: 21.33 KG/M2 | WEIGHT: 144 LBS | DIASTOLIC BLOOD PRESSURE: 60 MMHG

## 2020-01-07 DIAGNOSIS — Z30.017 INSERTION OF IMPLANTABLE SUBDERMAL CONTRACEPTIVE: Primary | ICD-10-CM

## 2020-01-07 DIAGNOSIS — Z72.51 UNPROTECTED SEX: ICD-10-CM

## 2020-01-07 DIAGNOSIS — R55 SYNCOPE, UNSPECIFIED SYNCOPE TYPE: ICD-10-CM

## 2020-01-07 LAB
CONTROL LINE PRESENT WITH A CLEAR BACKGROUND (YES/NO): YES YES/NO
PREGNANCY TEST, URINE: NEGATIVE

## 2020-01-07 PROCEDURE — 11981 INSERTION DRUG DLVR IMPLANT: CPT | Performed by: OBSTETRICS & GYNECOLOGY

## 2020-01-07 PROCEDURE — 81025 URINE PREGNANCY TEST: CPT | Performed by: OBSTETRICS & GYNECOLOGY

## 2020-01-07 NOTE — PROGRESS NOTES
Nexplanon Insertion    Pregnancy Results: negative from urine test   Birth control method(s) used:  OCP  Consent was obtained from the patient. Insertion:    The patient was positioned with her left arm flexed.      2% lidocaine with epinephrine  was use

## 2020-02-04 ENCOUNTER — LAB ENCOUNTER (OUTPATIENT)
Dept: LAB | Age: 25
End: 2020-02-04
Attending: OBSTETRICS & GYNECOLOGY
Payer: COMMERCIAL

## 2020-02-04 ENCOUNTER — OFFICE VISIT (OUTPATIENT)
Dept: OBGYN CLINIC | Facility: CLINIC | Age: 25
End: 2020-02-04
Payer: COMMERCIAL

## 2020-02-04 VITALS
DIASTOLIC BLOOD PRESSURE: 54 MMHG | SYSTOLIC BLOOD PRESSURE: 102 MMHG | BODY MASS INDEX: 20.44 KG/M2 | WEIGHT: 138 LBS | HEART RATE: 91 BPM | HEIGHT: 69 IN

## 2020-02-04 DIAGNOSIS — R55 SYNCOPE, UNSPECIFIED SYNCOPE TYPE: ICD-10-CM

## 2020-02-04 DIAGNOSIS — Z30.46 ENCOUNTER FOR SURVEILLANCE OF NEXPLANON SUBDERMAL CONTRACEPTIVE: Primary | ICD-10-CM

## 2020-02-04 LAB — TSI SER-ACNC: 0.39 MIU/ML (ref 0.36–3.74)

## 2020-02-04 PROCEDURE — 84443 ASSAY THYROID STIM HORMONE: CPT

## 2020-02-04 PROCEDURE — 99213 OFFICE O/P EST LOW 20 MIN: CPT | Performed by: OBSTETRICS & GYNECOLOGY

## 2020-02-04 NOTE — PROGRESS NOTES
Biju Prater is a 27year old female [facility] Patient's last menstrual period was 01/09/2020 (exact date). Patient presents with: Other: nexplanon f/u  . Patient has no complaints, palpates implant regularly     OBSTETRICS HISTORY:  OB History   Grav violence:        Fear of current or ex partner: Not on file        Emotionally abused: Not on file        Physically abused: Not on file        Forced sexual activity: Not on file    Other Topics      Concerns:         Service: Not Asked        Blo

## 2020-02-06 DIAGNOSIS — R55 SYNCOPE, UNSPECIFIED SYNCOPE TYPE: Primary | ICD-10-CM

## 2020-02-06 NOTE — PROGRESS NOTES
Patient aware of results and recommendations. 8 week follow up lab routed. Patient verbalized understanding.

## 2021-09-25 NOTE — TELEPHONE ENCOUNTER
Informed patient she should take the Effexor as she normally would tonight rather than taking one now and then another tonight. Patient verbalizes understanding. 4

## 2022-01-11 ENCOUNTER — OFFICE VISIT (OUTPATIENT)
Dept: OBGYN CLINIC | Facility: CLINIC | Age: 27
End: 2022-01-11
Payer: COMMERCIAL

## 2022-01-11 VITALS
WEIGHT: 142.19 LBS | SYSTOLIC BLOOD PRESSURE: 118 MMHG | DIASTOLIC BLOOD PRESSURE: 76 MMHG | BODY MASS INDEX: 20.36 KG/M2 | HEIGHT: 70 IN

## 2022-01-11 DIAGNOSIS — Z01.419 ENCOUNTER FOR WELL WOMAN EXAM WITH ROUTINE GYNECOLOGICAL EXAM: Primary | ICD-10-CM

## 2022-01-11 DIAGNOSIS — Z11.3 SCREEN FOR STD (SEXUALLY TRANSMITTED DISEASE): ICD-10-CM

## 2022-01-11 DIAGNOSIS — Z12.4 CERVICAL CANCER SCREENING: ICD-10-CM

## 2022-01-11 DIAGNOSIS — Z30.46 ENCOUNTER FOR REMOVAL OF SUBDERMAL CONTRACEPTIVE IMPLANT: ICD-10-CM

## 2022-01-11 PROCEDURE — 87491 CHLMYD TRACH DNA AMP PROBE: CPT | Performed by: OBSTETRICS & GYNECOLOGY

## 2022-01-11 PROCEDURE — 11982 REMOVE DRUG IMPLANT DEVICE: CPT | Performed by: OBSTETRICS & GYNECOLOGY

## 2022-01-11 PROCEDURE — 87591 N.GONORRHOEAE DNA AMP PROB: CPT | Performed by: OBSTETRICS & GYNECOLOGY

## 2022-01-11 PROCEDURE — 88175 CYTOPATH C/V AUTO FLUID REDO: CPT | Performed by: OBSTETRICS & GYNECOLOGY

## 2022-01-11 PROCEDURE — 3078F DIAST BP <80 MM HG: CPT | Performed by: OBSTETRICS & GYNECOLOGY

## 2022-01-11 PROCEDURE — 3008F BODY MASS INDEX DOCD: CPT | Performed by: OBSTETRICS & GYNECOLOGY

## 2022-01-11 PROCEDURE — 3074F SYST BP LT 130 MM HG: CPT | Performed by: OBSTETRICS & GYNECOLOGY

## 2022-01-11 PROCEDURE — 99395 PREV VISIT EST AGE 18-39: CPT | Performed by: OBSTETRICS & GYNECOLOGY

## 2022-01-11 RX ORDER — IRON,CARBONYL/ASCORBIC ACID 100-250 MG
TABLET ORAL
COMMUNITY

## 2022-01-11 RX ORDER — GLYCERIN/MINERAL OIL
LOTION (ML) TOPICAL
COMMUNITY

## 2022-01-11 RX ORDER — ACETAMINOPHEN, ASPIRIN AND CAFFEINE 250; 250; 65 MG/1; MG/1; MG/1
1 TABLET, FILM COATED ORAL EVERY 6 HOURS PRN
COMMUNITY

## 2022-01-11 NOTE — PROGRESS NOTES
Jessica Richardson is a 27year old female [facility] Patient's last menstrual period was 01/03/2022 (exact date).  Patient presents with:  Wellness Visit  Other: Nexplanon Removal  .Patient c/o feeling drained, has been bleeding on and off last couple months Self-Exams: Not Asked    Social History Narrative      Not on file    Social Determinants of Health  Financial Resource Strain: Not on file  Food Insecurity: Not on file  Transportation Needs: Not on file  Physical Activity: Not on file  Stress: Not on jaun anxiety. Endocrine:   denies excessive thirst or urination. Heme/Lymph:  denies history of anemia, easy bruising or bleeding.       PHYSICAL EXAM:   Constitutional: well developed, well nourished  Head/Face: normocephalic  Neck/Thyroid: thyroid symmetric, woman exam with routine gynecological exam  -     CBC WITH DIFFERENTIAL WITH PLATELET  -     COMP METABOLIC PANEL (14)  -     ASSAY, THYROID STIM HORMONE    Cervical cancer screening  -     THINPREP PAP WITH HPV REFLEX REQUEST B    Screen for STD (sexually

## 2022-01-12 LAB
C TRACH DNA SPEC QL NAA+PROBE: NEGATIVE
N GONORRHOEA DNA SPEC QL NAA+PROBE: NEGATIVE

## 2022-04-21 LAB
ABSOLUTE BASOPHILS: 50 CELLS/UL (ref 0–200)
ABSOLUTE EOSINOPHILS: 130 CELLS/UL (ref 15–500)
ABSOLUTE LYMPHOCYTES: 1886 CELLS/UL (ref 850–3900)
ABSOLUTE MONOCYTES: 590 CELLS/UL (ref 200–950)
ABSOLUTE NEUTROPHILS: 4543 CELLS/UL (ref 1500–7800)
ALBUMIN/GLOBULIN RATIO: 1.8 (CALC) (ref 1–2.5)
ALBUMIN: 4.4 G/DL (ref 3.6–5.1)
ALKALINE PHOSPHATASE: 60 U/L (ref 31–125)
ALT: 15 U/L (ref 6–29)
AST: 15 U/L (ref 10–30)
BASOPHILS: 0.7 %
BILIRUBIN, TOTAL: 0.6 MG/DL (ref 0.2–1.2)
BUN: 10 MG/DL (ref 7–25)
CALCIUM: 9.5 MG/DL (ref 8.6–10.2)
CARBON DIOXIDE: 29 MMOL/L (ref 20–32)
CHLORIDE: 103 MMOL/L (ref 98–110)
CREATININE: 0.81 MG/DL (ref 0.5–1.1)
EGFR IF AFRICN AM: 118 ML/MIN/1.73M2
EGFR IF NONAFRICN AM: 102 ML/MIN/1.73M2
EOSINOPHILS: 1.8 %
GLOBULIN: 2.4 G/DL (CALC) (ref 1.9–3.7)
GLUCOSE: 131 MG/DL (ref 65–99)
HEMATOCRIT: 40.1 % (ref 35–45)
HEMOGLOBIN: 13.3 G/DL (ref 11.7–15.5)
LYMPHOCYTES: 26.2 %
MCH: 30.1 PG (ref 27–33)
MCHC: 33.2 G/DL (ref 32–36)
MCV: 90.7 FL (ref 80–100)
MONOCYTES: 8.2 %
MPV: 11.1 FL (ref 7.5–12.5)
NEUTROPHILS: 63.1 %
PLATELET COUNT: 295 THOUSAND/UL (ref 140–400)
POTASSIUM: 4.1 MMOL/L (ref 3.5–5.3)
PROTEIN, TOTAL: 6.8 G/DL (ref 6.1–8.1)
RDW: 11.1 % (ref 11–15)
RED BLOOD CELL COUNT: 4.42 MILLION/UL (ref 3.8–5.1)
SODIUM: 138 MMOL/L (ref 135–146)
TSH: 0.64 MIU/L
WHITE BLOOD CELL COUNT: 7.2 THOUSAND/UL (ref 3.8–10.8)

## 2022-05-03 LAB — HEMOGLOBIN A1C: 5.1 % OF TOTAL HGB

## 2022-05-26 ENCOUNTER — OFFICE VISIT (OUTPATIENT)
Dept: AUDIOLOGY | Facility: CLINIC | Age: 27
End: 2022-05-26
Payer: COMMERCIAL

## 2022-05-26 ENCOUNTER — OFFICE VISIT (OUTPATIENT)
Dept: OTOLARYNGOLOGY | Facility: CLINIC | Age: 27
End: 2022-05-26
Payer: COMMERCIAL

## 2022-05-26 VITALS — WEIGHT: 137 LBS | HEIGHT: 69 IN | BODY MASS INDEX: 20.29 KG/M2

## 2022-05-26 DIAGNOSIS — R42 DIZZINESS: Primary | ICD-10-CM

## 2022-05-26 DIAGNOSIS — R42 MIGRAINOUS DIZZINESS: ICD-10-CM

## 2022-05-26 DIAGNOSIS — M26.609 TMJ (TEMPOROMANDIBULAR JOINT SYNDROME): ICD-10-CM

## 2022-05-26 RX ORDER — LEVOCETIRIZINE DIHYDROCHLORIDE 5 MG/1
5 TABLET, FILM COATED ORAL EVERY EVENING
Qty: 90 TABLET | Refills: 4 | Status: SHIPPED | OUTPATIENT
Start: 2022-05-26

## 2022-05-26 RX ORDER — FLUTICASONE PROPIONATE 50 MCG
2 SPRAY, SUSPENSION (ML) NASAL DAILY
Qty: 3 EACH | Refills: 4 | Status: SHIPPED | OUTPATIENT
Start: 2022-05-26

## 2022-05-26 RX ORDER — MONTELUKAST SODIUM 10 MG/1
10 TABLET ORAL NIGHTLY
Qty: 30 TABLET | Refills: 11 | Status: SHIPPED | OUTPATIENT
Start: 2022-05-26

## 2022-05-26 RX ORDER — MECLIZINE HYDROCHLORIDE 25 MG/1
TABLET ORAL
COMMUNITY
Start: 2022-05-25

## 2022-06-27 ENCOUNTER — TELEPHONE (OUTPATIENT)
Dept: PHYSICAL THERAPY | Facility: HOSPITAL | Age: 27
End: 2022-06-27

## 2022-06-28 ENCOUNTER — OFFICE VISIT (OUTPATIENT)
Dept: FAMILY MEDICINE CLINIC | Facility: CLINIC | Age: 27
End: 2022-06-28
Payer: COMMERCIAL

## 2022-06-28 ENCOUNTER — APPOINTMENT (OUTPATIENT)
Dept: PHYSICAL THERAPY | Age: 27
End: 2022-06-28
Attending: OTOLARYNGOLOGY
Payer: COMMERCIAL

## 2022-06-28 ENCOUNTER — TELEPHONE (OUTPATIENT)
Dept: PHYSICAL THERAPY | Facility: HOSPITAL | Age: 27
End: 2022-06-28

## 2022-06-28 VITALS
OXYGEN SATURATION: 98 % | RESPIRATION RATE: 18 BRPM | BODY MASS INDEX: 20.73 KG/M2 | HEIGHT: 69 IN | SYSTOLIC BLOOD PRESSURE: 112 MMHG | WEIGHT: 140 LBS | DIASTOLIC BLOOD PRESSURE: 70 MMHG | HEART RATE: 69 BPM | TEMPERATURE: 98 F

## 2022-06-28 DIAGNOSIS — Z76.89 ENCOUNTER TO ESTABLISH CARE: ICD-10-CM

## 2022-06-28 DIAGNOSIS — R42 DIZZINESS: Primary | ICD-10-CM

## 2022-06-28 DIAGNOSIS — R53.83 OTHER FATIGUE: ICD-10-CM

## 2022-06-28 PROCEDURE — 3074F SYST BP LT 130 MM HG: CPT | Performed by: FAMILY MEDICINE

## 2022-06-28 PROCEDURE — 99203 OFFICE O/P NEW LOW 30 MIN: CPT | Performed by: FAMILY MEDICINE

## 2022-06-28 PROCEDURE — 3078F DIAST BP <80 MM HG: CPT | Performed by: FAMILY MEDICINE

## 2022-06-28 PROCEDURE — 3008F BODY MASS INDEX DOCD: CPT | Performed by: FAMILY MEDICINE

## 2022-06-29 ENCOUNTER — TELEPHONE (OUTPATIENT)
Dept: FAMILY MEDICINE CLINIC | Facility: CLINIC | Age: 27
End: 2022-06-29

## 2022-06-29 NOTE — TELEPHONE ENCOUNTER
These forms were received and are in Dr Valentina Douglass folder for review  Patient advised. Verbalized understanding.

## 2022-06-29 NOTE — TELEPHONE ENCOUNTER
Pt was seen yesterday with dr Javad Sarmiento was going to send a form for Dr Angelique Jerome to fill out. Informed pt we have not received this yet.   Will call [employer] and call back

## 2022-06-30 ENCOUNTER — APPOINTMENT (OUTPATIENT)
Dept: PHYSICAL THERAPY | Age: 27
End: 2022-06-30
Attending: OTOLARYNGOLOGY
Payer: COMMERCIAL

## 2022-06-30 NOTE — TELEPHONE ENCOUNTER
LMTCB    We need her job title for the form-otherwise they are ready for pickup  Does she want us to fax them or come ?     Forms in nurse pending folder

## 2022-06-30 NOTE — TELEPHONE ENCOUNTER
Patient advised. Verbalized understanding.    Job title entered on forms and faxed  Copy in scan pending

## 2022-07-05 ENCOUNTER — APPOINTMENT (OUTPATIENT)
Dept: PHYSICAL THERAPY | Age: 27
End: 2022-07-05
Attending: OTOLARYNGOLOGY
Payer: COMMERCIAL

## 2022-07-07 ENCOUNTER — APPOINTMENT (OUTPATIENT)
Dept: PHYSICAL THERAPY | Age: 27
End: 2022-07-07
Attending: OTOLARYNGOLOGY
Payer: COMMERCIAL

## 2022-07-07 ENCOUNTER — TELEPHONE (OUTPATIENT)
Dept: PHYSICAL THERAPY | Facility: HOSPITAL | Age: 27
End: 2022-07-07

## 2022-07-11 ENCOUNTER — TELEPHONE (OUTPATIENT)
Dept: PHYSICAL THERAPY | Facility: HOSPITAL | Age: 27
End: 2022-07-11

## 2022-07-12 ENCOUNTER — APPOINTMENT (OUTPATIENT)
Dept: PHYSICAL THERAPY | Age: 27
End: 2022-07-12
Attending: OTOLARYNGOLOGY
Payer: COMMERCIAL

## 2022-07-13 ENCOUNTER — TELEPHONE (OUTPATIENT)
Dept: FAMILY MEDICINE CLINIC | Facility: CLINIC | Age: 27
End: 2022-07-13

## 2022-07-13 NOTE — TELEPHONE ENCOUNTER
Received fax from [facility] not approved because the condition does not meet the definition of a \"serious health condition\" as defined by [place].  Copy in scan pending folder  [facility] sent to patient

## 2022-07-14 ENCOUNTER — APPOINTMENT (OUTPATIENT)
Dept: PHYSICAL THERAPY | Age: 27
End: 2022-07-14
Attending: OTOLARYNGOLOGY
Payer: COMMERCIAL

## 2022-07-14 ENCOUNTER — TELEPHONE (OUTPATIENT)
Dept: PHYSICAL THERAPY | Facility: HOSPITAL | Age: 27
End: 2022-07-14

## 2022-07-20 ENCOUNTER — TELEPHONE (OUTPATIENT)
Dept: PHYSICAL THERAPY | Facility: HOSPITAL | Age: 27
End: 2022-07-20

## 2022-07-21 ENCOUNTER — APPOINTMENT (OUTPATIENT)
Dept: PHYSICAL THERAPY | Age: 27
End: 2022-07-21
Attending: OTOLARYNGOLOGY
Payer: COMMERCIAL

## 2022-07-26 ENCOUNTER — APPOINTMENT (OUTPATIENT)
Dept: PHYSICAL THERAPY | Age: 27
End: 2022-07-26

## 2022-07-28 ENCOUNTER — APPOINTMENT (OUTPATIENT)
Dept: PHYSICAL THERAPY | Age: 27
End: 2022-07-28

## 2022-07-28 ENCOUNTER — TELEPHONE (OUTPATIENT)
Dept: FAMILY MEDICINE CLINIC | Facility: CLINIC | Age: 27
End: 2022-07-28

## 2022-07-28 NOTE — TELEPHONE ENCOUNTER
Overdue labs  [facility] sent  Future Appointments   Date Time Provider Mackenzie Zavaleta   8/22/2022  2:00 PM Suzy Gottlieb [place] [id number]

## 2022-08-02 ENCOUNTER — APPOINTMENT (OUTPATIENT)
Dept: PHYSICAL THERAPY | Age: 27
End: 2022-08-02

## 2022-08-04 ENCOUNTER — APPOINTMENT (OUTPATIENT)
Dept: PHYSICAL THERAPY | Age: 27
End: 2022-08-04

## 2022-08-09 ENCOUNTER — APPOINTMENT (OUTPATIENT)
Dept: PHYSICAL THERAPY | Age: 27
End: 2022-08-09

## 2022-08-11 ENCOUNTER — APPOINTMENT (OUTPATIENT)
Dept: PHYSICAL THERAPY | Age: 27
End: 2022-08-11

## 2022-08-16 ENCOUNTER — APPOINTMENT (OUTPATIENT)
Dept: PHYSICAL THERAPY | Age: 27
End: 2022-08-16

## 2022-08-18 ENCOUNTER — APPOINTMENT (OUTPATIENT)
Dept: PHYSICAL THERAPY | Age: 27
End: 2022-08-18

## 2022-08-22 ENCOUNTER — OFFICE VISIT (OUTPATIENT)
Dept: NEUROLOGY | Facility: CLINIC | Age: 27
End: 2022-08-22

## 2022-08-22 DIAGNOSIS — R26.89 IMBALANCE: ICD-10-CM

## 2022-08-22 DIAGNOSIS — R42 VERTIGO: Primary | ICD-10-CM

## 2022-08-22 DIAGNOSIS — R68.89 HEAT INTOLERANCE: ICD-10-CM

## 2022-08-22 DIAGNOSIS — R29.2 HYPERREFLEXIA: ICD-10-CM

## 2022-08-22 DIAGNOSIS — G43.009 MIGRAINE WITHOUT AURA, NOT INTRACTABLE, WITHOUT STATUS MIGRAINOSUS: ICD-10-CM

## 2022-08-22 PROCEDURE — 99214 OFFICE O/P EST MOD 30 MIN: CPT | Performed by: OTHER

## 2022-08-22 RX ORDER — SUMATRIPTAN 100 MG/1
TABLET, FILM COATED ORAL
Qty: 9 TABLET | Refills: 3 | Status: SHIPPED | OUTPATIENT
Start: 2022-08-22

## 2022-08-22 RX ORDER — CALCIUM CARBONATE 300MG(750)
400 TABLET,CHEWABLE ORAL 2 TIMES DAILY
Qty: 10 TABLET | Refills: 0 | Status: SHIPPED | OUTPATIENT
Start: 2022-08-22

## 2022-08-22 RX ORDER — PROPRANOLOL HYDROCHLORIDE 10 MG/1
20 TABLET ORAL 2 TIMES DAILY
Qty: 360 TABLET | Refills: 0 | Status: SHIPPED | OUTPATIENT
Start: 2022-08-22 | End: 2022-11-20

## 2022-09-13 ENCOUNTER — OFFICE VISIT (OUTPATIENT)
Dept: AUDIOLOGY | Facility: CLINIC | Age: 27
End: 2022-09-13
Payer: COMMERCIAL

## 2022-09-13 DIAGNOSIS — R42 DIZZINESS: Primary | ICD-10-CM

## 2022-09-13 PROCEDURE — 92537 CALORIC VSTBLR TEST W/REC: CPT | Performed by: AUDIOLOGIST

## 2022-09-13 PROCEDURE — 92540 BASIC VESTIBULAR EVALUATION: CPT | Performed by: AUDIOLOGIST

## 2022-09-13 PROCEDURE — 92517 VEMP TEST I&R CERVICAL: CPT | Performed by: AUDIOLOGIST

## 2022-09-15 ENCOUNTER — OFFICE VISIT (OUTPATIENT)
Dept: OTOLARYNGOLOGY | Facility: CLINIC | Age: 27
End: 2022-09-15
Payer: COMMERCIAL

## 2022-09-15 VITALS — BODY MASS INDEX: 20.73 KG/M2 | WEIGHT: 140 LBS | TEMPERATURE: 98 F | HEIGHT: 69 IN

## 2022-09-15 DIAGNOSIS — R26.89 IMBALANCE: Primary | ICD-10-CM

## 2022-09-15 PROCEDURE — 3008F BODY MASS INDEX DOCD: CPT | Performed by: OTOLARYNGOLOGY

## 2022-09-15 PROCEDURE — 99214 OFFICE O/P EST MOD 30 MIN: CPT | Performed by: OTOLARYNGOLOGY

## 2022-09-15 RX ORDER — CELECOXIB 200 MG/1
200 CAPSULE ORAL DAILY PRN
Qty: 30 CAPSULE | Refills: 0 | Status: SHIPPED | OUTPATIENT
Start: 2022-09-15 | End: 2022-10-15

## 2022-09-15 RX ORDER — CYCLOBENZAPRINE HCL 5 MG
5 TABLET ORAL NIGHTLY
Qty: 30 TABLET | Refills: 1 | Status: SHIPPED | OUTPATIENT
Start: 2022-09-15

## 2022-09-26 ENCOUNTER — TELEPHONE (OUTPATIENT)
Dept: PHYSICAL THERAPY | Facility: HOSPITAL | Age: 27
End: 2022-09-26

## 2022-09-26 ENCOUNTER — HOSPITAL ENCOUNTER (OUTPATIENT)
Dept: MRI IMAGING | Age: 27
Discharge: HOME | End: 2022-09-26
Attending: Other

## 2022-09-26 DIAGNOSIS — R29.2 HYPERREFLEXIA: ICD-10-CM

## 2022-09-26 DIAGNOSIS — G43.009 MIGRAINE WITHOUT AURA, NOT INTRACTABLE, WITHOUT STATUS MIGRAINOSUS: ICD-10-CM

## 2022-09-26 DIAGNOSIS — R42 VERTIGO: ICD-10-CM

## 2022-09-26 PROCEDURE — 72141 MRI NECK SPINE W/O DYE: CPT | Performed by: OTHER

## 2022-09-26 PROCEDURE — 70553 MRI BRAIN STEM W/O & W/DYE: CPT | Performed by: OTHER

## 2022-09-26 PROCEDURE — A9575 INJ GADOTERATE MEGLUMI 0.1ML: HCPCS

## 2022-09-27 ENCOUNTER — OFFICE VISIT (OUTPATIENT)
Dept: PHYSICAL THERAPY | Age: 27
End: 2022-09-27
Attending: Other

## 2022-09-27 DIAGNOSIS — R42 VERTIGO: ICD-10-CM

## 2022-09-27 DIAGNOSIS — R26.89 IMBALANCE: ICD-10-CM

## 2022-09-27 PROCEDURE — 97162 PT EVAL MOD COMPLEX 30 MIN: CPT

## 2022-09-27 PROCEDURE — 97110 THERAPEUTIC EXERCISES: CPT

## 2022-09-27 NOTE — PROGRESS NOTES
VESTIBULAR EVALUATION:     Diagnosis:   Imbalance (R26.89)  Vertigo (R42)      Referring Provider: Magalis George  Date of Evaluation:    9/27/2022    Precautions:  None Next MD visit:   none scheduled  Date of Surgery: n/a     PATIENT SUMMARY   Tyler Carranza is a 27year old female who presents to therapy today with reports of constant dizziness since May 2022 following a motorcycle trip. She states after VNG testing and calorics she notes some improvement in dizziness and less fullness in L ear. Generally she reports, \"I feel like my mind is on a boat rocking\" At the outset of the dizziness \"felt more drunk\" but intensity is lessening. She denies falls. She states she can't walk in the dark, feeling she has to put her hands out and turn the lights on. She states she feels she has to have a hand on something especially at night. 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  end at [facility], walking all day with few breaks. Pt notes she does have a long history of grinding and clenching her teeth and will see dentist 10/11/22. Seen by neurology for issues with migraines which she states she is not having very often anymore but states that she has constant temporal and occipital headaches bilaterally. Clenches a lot throughout the day. She reports 4 headaches on average throughout the week. Per neurology notes: VNG reveals no abnormalities of the inner ear. No central findings noted either. Initially dizziness began on standing, but then became constant. Tried meclizine without relief. She co sensitivity in the back of her head. Pt had MRI of c-spine and brain 9/26/22 that revealed no abnormalities aside from reversal of cervical lordosis at C5-C6. Pt reports she feels she has to turn slower to compensate for her eyes. She states turning when driving is difficult. And she feels her eyes have to \"catch up\". She reports a history of motion sickness.  She reports current dizziness does not change with activity or position. Symptoms with cough/sneeze or loud noise: No  Falls: No  Hx of migraines: Yes: 9/25/22 most recent, throbbing, mitigated with Excedrin, ice pack resolved 9 hrs  Hx of vision issue: Yes: will see eye doctor soon, feels she has more difficulty reading  Hx of hearing issues: fullness L ear feels like it's clogged    Dizziness: Current 4/10, Best 3/10, Worst 9/10  Quality: staggering, off balance, need to walk slow  Frequency/Duration:  Longer than minutes, to hours  Aggravates: Sit to stand, Bending over, Quick head movements, Repetitive movements, Turning/direction changes, Looking/reaching up, Headache, Fatigue, Visual motion, Shopping, Elevators and Exertion  Relieves: Not moving and Lying down    Headache:   Frequency/Duration:  hours  Aggravates: Unsure   Relieves: Lying down, Supporting head and Cold   Normally wake up with headache, clenching jaw, seeing a dentist for bruxism 10/11/22. Takes Excedrin for HA    Cervical pain: pain with SB and at base of occiput  Aggravates: Neck rotation, Neck sidebending and Work activities   Relieves: Cold, Supporting head and Lying down     Dizziness Handicap Inventory [facility]): 44/100     Current functional limitations include looking up, walking through dim lighting, driving, transitional movements,walking in busy environments, reading, shopping, household chores, quick head movements, travel, driving/passenger, bending over. Jann Camacho describes prior level of function active, working full time, independent and without co aside of headaches and mild motion intolerance   Pt goals include \"get rid of vertigo disorientation\"  Past medical history was reviewed with Jann Camacho. Significant findings include  has a past medical history of GERD (gastroesophageal reflux disease), Migraines, and Seasonal allergies.   anxiety, imbalance, bruxism, jaw clenching, and migraine without aura       ASSESSMENT  Jann Camacho presents to physical therapy evaluation with primary c/o constant dizziness since May 2022 following a motorcycle trip. The results of the objective tests and measures show moderate dizziness handicap and motion sensitivity, no central signs, - BPPV, no replicating head or neck motion, decreased postural control. Functional deficits include but are not limited to looking up, walking through dim lighting, driving, transitional movements,walking in busy environments, reading, shopping, household chores, quick head movements, travel, driving/passenger, bending over. Signs and symptoms are consistent with diagnosis of imbalance, vertigo, potential for 3PD, high motion sensitivity or cervicogenic dizziness, all to be further assessed in upcoming visits as appropriate. Pt and PT discussed evaluation findings, pathology, POC and HEP. Pt's comorbidities and psychosocial issues may impact PT POC and pt progress. Pt voiced understanding and performs HEP correctly. Skilled Physical Therapy is medically necessary to address the above impairments and reach functional goals. OBJECTIVE:   Physical Exam:  Posture/Observation: Forward head, rounded shoulders     Coordination Testing:   Finger to Nose: WNL   Pronation/supination: WNL   Toe tapping:  WNL     Cervical spine ROM:   All WNL, but \"delayed\" in vision with rotation  Adverse neuro signs with ROM: yes no: no     Occulomotor & Vestibulo-Ocular Exam:  Spontaneous Nystagmus: room light: - ;  fixation blocked: -  Smooth Pursuit: Negative  Saccades: Negative  Gaze Evoked Nystagmus:  room light: Negative; fixation blocked: Negative  Head Thrust: Negative  VOR screen:  -    VOR Cancellation: Negative   Convergence: loss of convergence, does not report seeing 2 pens   Cover/Uncover:  -  Cross Cover:  Negative  Head Shaking Nystagmus: Negative  Dynamic Visual Acuity:  Not Tested    Positional Testing:   [place]: R -, L -   Roll Test [facility]): - B     Postural Control:   NT 2/2 time constraints  Functional Mobility:   Gait: pt ambulates on level ground with path deviation with visual scanning and wide MICHELLE. Today's Treatment and Response:   Pt education was provided on exam findings, treatment diagnosis, treatment plan, expectations, and prognosis. Pt was also provided recommendations for activity modifications, possible soreness after evaluation, postural corrections, detrimental fear avoidance behaviors, importance of remaining active and possible dizziness after evaluation. Patient was instructed in and issued a HEP for: return to \"normal\" activity with less guarded head and neck mobility, walking program    Charges: PT Eval Moderate Complexity, 1 ther ex      Total Timed Treatment: 8 min     Total Treatment Time: 48 min     Based on clinical rationale and outcome measures, this evaluation involved Moderate Complexity decision making due to 1-2 personal factors/comorbidities, 3 body structures involved/activity limitations, and evolving symptoms including dizziness/stability. PLAN OF CARE:    Goals: (to be met in 10 visits)  Pt to report ability to turn over in bed without dizziness. Pt to report ability to bend forward to tie shoe laces without dizziness. Pt to report ability to perform supine<>sit without dizziness. Pt to report no c/o dizziness with positional changes x 30 days      Vestibular goals for unilateral, bilateral or central deficits:   Pt to report ability to turn head when called upon with minimal to no c/o dizziness   Pt to report ability to change speeds when walking with minimal to no c/o dizziness   Perform household chores with dizziness under 3/10 and without nausea    Pt to improve [address] by 12 points to improve community function. Pt to be independent with HEP to self-manage symptoms and be able to return to prior level of function. Frequency / Duration: Patient will be seen for 1-2 x/week or a total of 10 visits over a 90 day period.  Treatment will include: home exercise program development and instruction, patient/family education, balance training, canalith repositioning maneuver, eye/head coordination exercises, sensory organization training, optokinetic stimulation , ROM, exertion training, gait training, manual therapy and strengthening. Education or treatment limitation: see assessment   Rehab Potential: good     Patient/Family/Caregiver was advised of these findings, precautions, and treatment options and has agreed to actively participate in planning and for this course of care. Thank you for your referral. Please co-sign or sign and return this letter via fax as soon as possible to [phone]. If you have any questions, please contact me at Dept: [phone]    Sincerely,  Electronically signed by therapist: Abdulkadir Qunitanilla PT, AMYT  [de-identified] certification required: Yes  I certify the need for these services furnished under this plan of treatment and while under my care.     X___________________________________________________ Date____________________    Certification From: 6/27/2257  To:12/26/2022

## 2022-09-28 ENCOUNTER — TELEPHONE (OUTPATIENT)
Dept: OTOLARYNGOLOGY | Facility: CLINIC | Age: 27
End: 2022-09-28

## 2022-09-30 ENCOUNTER — OFFICE VISIT (OUTPATIENT)
Dept: PHYSICAL THERAPY | Age: 27
End: 2022-09-30
Attending: Other

## 2022-09-30 PROCEDURE — 97112 NEUROMUSCULAR REEDUCATION: CPT

## 2022-09-30 PROCEDURE — 97140 MANUAL THERAPY 1/> REGIONS: CPT

## 2022-09-30 PROCEDURE — 97110 THERAPEUTIC EXERCISES: CPT

## 2022-10-03 ENCOUNTER — OFFICE VISIT (OUTPATIENT)
Dept: PHYSICAL THERAPY | Age: 27
End: 2022-10-03
Attending: Other
Payer: COMMERCIAL

## 2022-10-03 PROCEDURE — 97140 MANUAL THERAPY 1/> REGIONS: CPT

## 2022-10-03 PROCEDURE — 97110 THERAPEUTIC EXERCISES: CPT

## 2022-10-10 ENCOUNTER — APPOINTMENT (OUTPATIENT)
Dept: PHYSICAL THERAPY | Age: 27
End: 2022-10-10
Attending: Other
Payer: COMMERCIAL

## 2022-10-10 ENCOUNTER — TELEPHONE (OUTPATIENT)
Dept: PHYSICAL THERAPY | Facility: HOSPITAL | Age: 27
End: 2022-10-10

## 2022-10-17 ENCOUNTER — OFFICE VISIT (OUTPATIENT)
Dept: PHYSICAL THERAPY | Age: 27
End: 2022-10-17
Attending: Other
Payer: COMMERCIAL

## 2022-10-17 PROCEDURE — 97110 THERAPEUTIC EXERCISES: CPT

## 2022-10-17 PROCEDURE — 97112 NEUROMUSCULAR REEDUCATION: CPT

## 2022-10-24 ENCOUNTER — OFFICE VISIT (OUTPATIENT)
Dept: PHYSICAL THERAPY | Age: 27
End: 2022-10-24
Attending: Other
Payer: COMMERCIAL

## 2022-10-24 PROCEDURE — 97140 MANUAL THERAPY 1/> REGIONS: CPT

## 2022-10-24 PROCEDURE — 97110 THERAPEUTIC EXERCISES: CPT

## 2022-10-24 PROCEDURE — 97112 NEUROMUSCULAR REEDUCATION: CPT

## 2022-10-25 ENCOUNTER — OFFICE VISIT (OUTPATIENT)
Dept: OTOLARYNGOLOGY | Facility: CLINIC | Age: 27
End: 2022-10-25
Payer: COMMERCIAL

## 2022-10-25 DIAGNOSIS — S03.00XD DISLOCATION OF TEMPOROMANDIBULAR JOINT, SUBSEQUENT ENCOUNTER: Primary | ICD-10-CM

## 2022-10-25 PROCEDURE — 99213 OFFICE O/P EST LOW 20 MIN: CPT | Performed by: OTOLARYNGOLOGY

## 2022-10-31 ENCOUNTER — APPOINTMENT (OUTPATIENT)
Dept: PHYSICAL THERAPY | Age: 27
End: 2022-10-31
Attending: Other
Payer: COMMERCIAL

## 2022-10-31 ENCOUNTER — TELEPHONE (OUTPATIENT)
Dept: PHYSICAL THERAPY | Facility: HOSPITAL | Age: 27
End: 2022-10-31

## 2022-11-03 ENCOUNTER — TELEPHONE (OUTPATIENT)
Dept: OTOLARYNGOLOGY | Facility: CLINIC | Age: 27
End: 2022-11-03

## 2022-11-03 NOTE — TELEPHONE ENCOUNTER
Patient states she was seen on 10/25 and was told 2 prescriptions were going to be sent to pharmacy below. Nothing has been sent.   Please advise       [facility] [id number] [facility], [address] [place] [address]  [phone], [phone]

## 2022-11-07 ENCOUNTER — OFFICE VISIT (OUTPATIENT)
Dept: PHYSICAL THERAPY | Age: 27
End: 2022-11-07
Attending: Other
Payer: COMMERCIAL

## 2022-11-07 PROCEDURE — 97110 THERAPEUTIC EXERCISES: CPT

## 2022-11-07 PROCEDURE — 97140 MANUAL THERAPY 1/> REGIONS: CPT

## 2022-11-14 ENCOUNTER — TELEPHONE (OUTPATIENT)
Dept: PHYSICAL THERAPY | Facility: HOSPITAL | Age: 27
End: 2022-11-14

## 2022-11-14 ENCOUNTER — APPOINTMENT (OUTPATIENT)
Dept: PHYSICAL THERAPY | Age: 27
End: 2022-11-14
Attending: Other
Payer: COMMERCIAL

## 2022-11-18 ENCOUNTER — TELEPHONE (OUTPATIENT)
Dept: PHYSICAL THERAPY | Facility: HOSPITAL | Age: 27
End: 2022-11-18

## 2022-11-21 ENCOUNTER — OFFICE VISIT (OUTPATIENT)
Dept: PHYSICAL THERAPY | Age: 27
End: 2022-11-21
Attending: Other
Payer: COMMERCIAL

## 2022-11-21 PROCEDURE — 97110 THERAPEUTIC EXERCISES: CPT

## 2022-11-21 PROCEDURE — 97140 MANUAL THERAPY 1/> REGIONS: CPT

## 2022-12-01 ENCOUNTER — TELEPHONE (OUTPATIENT)
Dept: PHYSICAL THERAPY | Age: 27
End: 2022-12-01

## 2022-12-07 RX ORDER — CYCLOBENZAPRINE HCL 5 MG
5 TABLET ORAL NIGHTLY
Qty: 30 TABLET | Refills: 1 | Status: SHIPPED | OUTPATIENT
Start: 2022-12-07

## 2022-12-13 ENCOUNTER — APPOINTMENT (OUTPATIENT)
Dept: PHYSICAL THERAPY | Age: 27
End: 2022-12-13
Payer: COMMERCIAL

## 2022-12-20 ENCOUNTER — OFFICE VISIT (OUTPATIENT)
Dept: PHYSICAL THERAPY | Age: 27
End: 2022-12-20
Attending: OTOLARYNGOLOGY
Payer: COMMERCIAL

## 2022-12-20 PROCEDURE — 97110 THERAPEUTIC EXERCISES: CPT

## 2022-12-20 PROCEDURE — 97140 MANUAL THERAPY 1/> REGIONS: CPT

## 2022-12-27 ENCOUNTER — TELEPHONE (OUTPATIENT)
Dept: PHYSICAL THERAPY | Facility: HOSPITAL | Age: 27
End: 2022-12-27

## 2022-12-29 ENCOUNTER — APPOINTMENT (OUTPATIENT)
Dept: PHYSICAL THERAPY | Age: 27
End: 2022-12-29
Attending: OTOLARYNGOLOGY
Payer: COMMERCIAL

## 2023-01-04 ENCOUNTER — PATIENT MESSAGE (OUTPATIENT)
Dept: FAMILY MEDICINE CLINIC | Facility: CLINIC | Age: 28
End: 2023-01-04

## 2023-01-05 ENCOUNTER — TELEPHONE (OUTPATIENT)
Dept: PHYSICAL THERAPY | Facility: HOSPITAL | Age: 28
End: 2023-01-05

## 2023-01-05 ENCOUNTER — APPOINTMENT (OUTPATIENT)
Dept: PHYSICAL THERAPY | Age: 28
End: 2023-01-05
Attending: OTOLARYNGOLOGY
Payer: COMMERCIAL

## 2023-01-13 RX ORDER — CELECOXIB 200 MG/1
CAPSULE ORAL
Qty: 30 CAPSULE | Refills: 1 | Status: SHIPPED | OUTPATIENT
Start: 2023-01-13

## 2023-07-19 ENCOUNTER — OFFICE VISIT (OUTPATIENT)
Facility: CLINIC | Age: 28
End: 2023-07-19
Payer: COMMERCIAL

## 2023-07-19 VITALS
HEIGHT: 69 IN | DIASTOLIC BLOOD PRESSURE: 72 MMHG | HEART RATE: 74 BPM | SYSTOLIC BLOOD PRESSURE: 110 MMHG | WEIGHT: 139.63 LBS | BODY MASS INDEX: 20.68 KG/M2

## 2023-07-19 DIAGNOSIS — Z12.4 CERVICAL CANCER SCREENING: ICD-10-CM

## 2023-07-19 DIAGNOSIS — N92.6 IRREGULAR BLEEDING: ICD-10-CM

## 2023-07-19 DIAGNOSIS — Z01.419 ENCOUNTER FOR WELL WOMAN EXAM WITH ROUTINE GYNECOLOGICAL EXAM: Primary | ICD-10-CM

## 2023-07-19 PROCEDURE — 3008F BODY MASS INDEX DOCD: CPT | Performed by: OBSTETRICS & GYNECOLOGY

## 2023-07-19 PROCEDURE — 3074F SYST BP LT 130 MM HG: CPT | Performed by: OBSTETRICS & GYNECOLOGY

## 2023-07-19 PROCEDURE — 99395 PREV VISIT EST AGE 18-39: CPT | Performed by: OBSTETRICS & GYNECOLOGY

## 2023-07-19 PROCEDURE — 3078F DIAST BP <80 MM HG: CPT | Performed by: OBSTETRICS & GYNECOLOGY

## 2023-07-19 NOTE — PROGRESS NOTES
eJrri Gatica is a 24year old female [facility] Patient's last menstrual period was 2023 (exact date). Patient presents with:  Consult: Fertility check. . pt has been trying for four years, off birth control since 22. Melyssa Abbasi just has questions about process  LMP 23    . Patient has irregular menses , her BF is on TRT treatment, has semen saved , CORNELIUS info given  OBSTETRICS HISTORY:  OB History    Para Term  AB Living   0 0 0 0 0 0   SAB IAB Ectopic Multiple Live Births   0 0 0 0 0       GYNE HISTORY:  Periods irregular light    Sexual activity:   Yes      Partners:   Male      Birth control/ protection:   Condom                 MEDICAL HISTORY:  Past Medical History:   Diagnosis Date    GERD (gastroesophageal reflux disease)     Migraines     Seasonal allergies        SURGICAL HISTORY:  No past surgical history on file.     SOCIAL HISTORY:  Social History    Socioeconomic History      Marital status: Single      Spouse name: Not on file      Number of children: Not on file      Years of education: Not on file      Highest education level: Not on file    Occupational History      Not on file    Tobacco Use      Smoking status: Never      Smokeless tobacco: Never      Tobacco comments: NON-SMOKER    Vaping Use      Vaping Use: Never used    Substance and Sexual Activity      Alcohol use: Not Currently        Alcohol/week: 0.0 standard drinks of alcohol      Drug use: No      Sexual activity: Yes        Partners: Male        Birth control/protection: Condom    Other Topics      Concerns:         Service: Not Asked        Blood Transfusions: Not Asked        Caffeine Concern: Yes          coffee 1 cup daily        Occupational Exposure: Not Asked        Hobby Hazards: Not Asked        Sleep Concern: Not Asked        Stress Concern: Not Asked        Weight Concern: Not Asked        Special Diet: Not Asked        Back Care: Not Asked        Exercise: No        Bike Helmet: Not Asked Seat Belt: Yes        Self-Exams: Not Asked    Social History Narrative      Not on file    Social Determinants of Health  Financial Resource Strain: Not on file  Food Insecurity: Not on file  Transportation Needs: Not on file  Physical Activity: Not on file  Stress: Not on file  Social Connections: Not on file  Housing Stability: Not on file    FAMILY HISTORY:  Family History   Problem Relation Age of Onset    Cancer Mother 39        brain cancer    Migraines Mother     Anxiety Mother     Cancer Maternal Grandfather 79        lung       MEDICATIONS:    Current Outpatient Medications:     fluticasone propionate 50 MCG/ACT Nasal Suspension, 2 sprays by Nasal route daily. , Disp: 3 each, Rfl: 4    CELECOXIB 200 MG Oral Cap, TAKE 1 CAPSULE(200 MG) BY MOUTH DAILY AS NEEDED FOR PAIN, Disp: 30 capsule, Rfl: 1    cyclobenzaprine 5 MG Oral Tab, Take 1 tablet (5 mg total) by mouth nightly., Disp: 30 tablet, Rfl: 1    cyclobenzaprine 5 MG Oral Tab, Take 1 tablet (5 mg total) by mouth nightly., Disp: 30 tablet, Rfl: 1    Riboflavin 400 MG Oral Cap, Take 400 mg by mouth daily. For migraine prophylaxis, Disp: 90 capsule, Rfl: 3    levocetirizine 5 MG Oral Tab, Take 1 tablet (5 mg total) by mouth every evening., Disp: 90 tablet, Rfl: 4    montelukast 10 MG Oral Tab, Take 1 tablet (10 mg total) by mouth nightly., Disp: 30 tablet, Rfl: 11    ALLERGIES:  No Known Allergies      Review of Systems:  Constitutional:  Denies fatigue, night sweats, hot flashes  Eyes:  denies blurred or double vision  Cardiovascular:  denies chest pain or palpitations  Respiratory:  denies shortness of breath  Gastrointestinal:  denies heartburn, abdominal pain, diarrhea or constipation  Genitourinary:  denies dysuria, incontinence, abnormal vaginal discharge, vaginal itching  Musculoskeletal:  denies back pain. Skin/Breast:  Denies any breast pain, lumps, or discharge. Neurological:  denies headaches, extremity weakness or numbness.   Psychiatric: denies depression or anxiety. Endocrine:   denies excessive thirst or urination. Heme/Lymph:  denies history of anemia, easy bruising or bleeding. PHYSICAL EXAM:   Constitutional: well developed, well nourished  Head/Face: normocephalic  Neck/Thyroid: thyroid symmetric, no thyromegaly, no nodules, no adenopathy  Lymphatic:no abnormal supraclavicular or axillary adenopathy is noted  Breast: normal without palpable masses, tenderness, asymmetry, nipple discharge, nipple retraction or skin changes  Abdomen:  soft, nontender, nondistended, no masses  Skin/Hair: no unusual rashes or bruises  Extremities: no edema, no cyanosis  Psychiatric:  Oriented to time, place, person and situation.  Appropriate mood and affect    Pelvic Exam:  External Genitalia: normal appearance, hair distribution, and no lesions  Urethral Meatus:  normal in size, location, without lesions and prolapse  Bladder:  No fullness, masses or tenderness  Vagina:  Normal appearance without lesions, no abnormal discharge  Cervix:  Normal without tenderness on motion  Uterus: normal in size, contour, position, mobility, without tenderness  Adnexa: normal without masses or tenderness  Perineum: normal  Anus: no hemorroids     Assessment & Plan:  Diagnoses and all orders for this visit:    Encounter for well woman exam with routine gynecological exam    Cervical cancer screening  -     THINPREP PAP WITH HPV REFLEX REQUEST B; Future    Irregular bleeding  -     TSH W REFLEX TO FREE T4  -     PROLACTIN  -     REPRODUCTIVE ENDOCRINOLOGY - INTERNAL

## 2024-09-02 NOTE — TELEPHONE ENCOUNTER
Medication: VENLAFAXINE HCL 37.5 MG Oral Tab    Date of last refill: 08/13/19 (#30/0)  [place]    Last office visit: 09/25/18  Due back to clinic per last office note:  Around 05/25/19  Date next office visit schedul Tere Cook (:  1985) is a 39 y.o. female new patient referred to our office for evaluation of the following chief complaint(s):  New Patient        Assessment & Plan   ASSESSMENT/PLAN:  1. Gastroesophageal reflux disease, unspecified whether esophagitis present  -     lansoprazole (PREVACID) 30 MG delayed release capsule; Take 1 capsule by mouth daily, Disp-30 capsule, R-2Normal  -     famotidine (PEPCID) 40 MG tablet; Take 1 tablet by mouth every evening, Disp-90 tablet, R-0Normal  2. Epigastric pain  3. Esophageal dysphagia  4. Nausea  5. History of gastric bypass      -Counseled patient and provided written recommendations for diet and lifestyle modifications for GERD. Avoid tobacco, alcohol, NSAIDs.   -No relief with Protonix or Nexium in the past. Currently taking Prilosec 80 mg BID for relief of epigastric pain radiating to her back. Schedule EGD. Has previously tried Nexium and Protonix without relief. She's open to trying Prevacid + Pepcid QHS.   -Schedule EGD evaluation. May need to consider Bravo study pending results and response to medications.     Subjective   SUBJECTIVE/OBJECTIVE  Tere Cook is a 39 y.o. year old female with PMH pertinent for GERD, PUD, DIPESH, SVT, DVT, asthma, seizures, depression, anxiety. Surgical history is pertinent for cholecystectomy, , tubal ligation, hysterectomy, gastric bypass  s/p revision , umbilcal hernia repair with mesh, laparoscopic ROHINI for intussusception ().  Prior pertinent GI evaluation includes:     -EGD 2015 (Dr. Calle): report not available for review  -EGD 2018 (Dr. Rosenberg): normal esophagus, 1 cm clean-based anastomosis ulcer on the jejunal side of the GJ anastomosis  -Colonoscopy 2021 (Dr. Lin): report not available for review; random colon biopsies consistent with lymphocytic colitis  -EGD 3/4/2022: GJ anastomosis characterized by erosion and ulceration, no specimens collected  -EGD 3/27/2023

## 2024-11-11 ENCOUNTER — OFFICE VISIT (OUTPATIENT)
Dept: OBGYN CLINIC | Facility: CLINIC | Age: 29
End: 2024-11-11
Payer: COMMERCIAL

## 2024-11-11 ENCOUNTER — OFFICE VISIT (OUTPATIENT)
Dept: FAMILY MEDICINE CLINIC | Facility: CLINIC | Age: 29
End: 2024-11-11
Payer: COMMERCIAL

## 2024-11-11 ENCOUNTER — LAB ENCOUNTER (OUTPATIENT)
Dept: LAB | Age: 29
End: 2024-11-11
Payer: COMMERCIAL

## 2024-11-11 VITALS
BODY MASS INDEX: 19.99 KG/M2 | RESPIRATION RATE: 18 BRPM | TEMPERATURE: 98 F | HEART RATE: 100 BPM | SYSTOLIC BLOOD PRESSURE: 115 MMHG | WEIGHT: 135 LBS | OXYGEN SATURATION: 97 % | HEIGHT: 69 IN | DIASTOLIC BLOOD PRESSURE: 78 MMHG

## 2024-11-11 VITALS
SYSTOLIC BLOOD PRESSURE: 112 MMHG | HEIGHT: 69 IN | DIASTOLIC BLOOD PRESSURE: 78 MMHG | HEART RATE: 86 BPM | WEIGHT: 137.19 LBS | BODY MASS INDEX: 20.32 KG/M2

## 2024-11-11 DIAGNOSIS — N92.6 IRREGULAR PERIODS: ICD-10-CM

## 2024-11-11 DIAGNOSIS — Z32.01 PREGNANCY EXAMINATION OR TEST, POSITIVE RESULT (HCC): Primary | ICD-10-CM

## 2024-11-11 DIAGNOSIS — Z32.01 PREGNANCY EXAMINATION OR TEST, POSITIVE RESULT (HCC): ICD-10-CM

## 2024-11-11 DIAGNOSIS — R42 VERTIGO: ICD-10-CM

## 2024-11-11 DIAGNOSIS — R11.0 NAUSEA: ICD-10-CM

## 2024-11-11 DIAGNOSIS — Z3A.01 LESS THAN 8 WEEKS GESTATION OF PREGNANCY (HCC): ICD-10-CM

## 2024-11-11 DIAGNOSIS — R42 DIZZINESS: Primary | ICD-10-CM

## 2024-11-11 LAB
B-HCG SERPL-ACNC: ABNORMAL MIU/ML
CONTROL LINE PRESENT WITH A CLEAR BACKGROUND (YES/NO): YES YES/NO
KIT LOT #: ABNORMAL NUMERIC
PROGEST SERPL-MCNC: 15.72 NG/ML

## 2024-11-11 PROCEDURE — 84702 CHORIONIC GONADOTROPIN TEST: CPT

## 2024-11-11 PROCEDURE — 84144 ASSAY OF PROGESTERONE: CPT

## 2024-11-11 PROCEDURE — 36415 COLL VENOUS BLD VENIPUNCTURE: CPT

## 2024-11-11 RX ORDER — METOCLOPRAMIDE 10 MG/1
10 TABLET ORAL EVERY 6 HOURS PRN
Qty: 20 TABLET | Refills: 0 | Status: SHIPPED | OUTPATIENT
Start: 2024-11-11

## 2024-11-11 NOTE — PROGRESS NOTES
CHIEF COMPLAINT:     Chief Complaint   Patient presents with    Vertigo     Started 1-2 year ago   Worse the last 3-4 days    :      HPI:     Renee Paris is a 28 year old female presents with complaints of dizziness.  Patient has had symptoms for 3 days. Patient has a history of ongoing dizziness and has been diagnosed with vestibular migraines in the past.  Patient has been taking Excedrin migraine the past few days.  Currently patient has a minor HA, but her HAs the past few days are not constant.  Patient also vomited last Thursday and Friday mornings and has felt nauseous every morning since.  The dizziness is worse in the mornings typically.      Patient reports light pink vaginal bleeding about 2-3 weeks ago.      Patient reports SOB with walking, but then explains she is always SOB.  No CP.  No cardiac history of history of blood clots.       Current Outpatient Medications   Medication Sig Dispense Refill    fluticasone propionate 50 MCG/ACT Nasal Suspension 2 sprays by Nasal route daily. (Patient not taking: Reported on 11/11/2024) 3 each 4    metoclopramide (REGLAN) 10 MG Oral Tab Take 1 tablet (10 mg total) by mouth every 6 (six) hours as needed. 20 tablet 0    CELECOXIB 200 MG Oral Cap TAKE 1 CAPSULE(200 MG) BY MOUTH DAILY AS NEEDED FOR PAIN 30 capsule 1    cyclobenzaprine 5 MG Oral Tab Take 1 tablet (5 mg total) by mouth nightly. 30 tablet 1    cyclobenzaprine 5 MG Oral Tab Take 1 tablet (5 mg total) by mouth nightly. 30 tablet 1    levocetirizine 5 MG Oral Tab Take 1 tablet (5 mg total) by mouth every evening. 90 tablet 4    montelukast 10 MG Oral Tab Take 1 tablet (10 mg total) by mouth nightly. 30 tablet 11      Past Medical History:    GERD (gastroesophageal reflux disease)    Migraines    Seasonal allergies      Social History:  Social History     Socioeconomic History    Marital status: Single   Tobacco Use    Smoking status: Never    Smokeless tobacco: Never    Tobacco comments:      NON-SMOKER   Vaping Use    Vaping status: Never Used   Substance and Sexual Activity    Alcohol use: Not Currently     Alcohol/week: 0.0 standard drinks of alcohol    Drug use: No    Sexual activity: Yes     Partners: Male     Birth control/protection: Condom   Other Topics Concern    Caffeine Concern Yes     Comment: coffee 1 cup daily    Exercise No    Seat Belt Yes        REVIEW OF SYSTEMS:   GENERAL: No syncope.  No falling.   Denies fever, chills,weight change, decreased appetite.  SKIN: Denies rashes, skin wounds or ulcers.  EYES: Denies blurred vision or double vision  HENT: Denies facial weakness, congestion, rhinorrhea, sore throat, no diminished hearing, no aural fullness, no  tinnitus.  CHEST: Denies chest pain, or palpitations  LUNGS: Denies shortness of breath, cough, or wheezing  GI: Denies abdominal pain, + nausea   NEURO: + headaches.   No seizures, no confusion, no weakness, no abnormal sensation    EXAM:   /78   Pulse 100   Temp 98.2 °F (36.8 °C)   Resp 18   Ht 5' 9\" (1.753 m)   Wt 135 lb (61.2 kg)   LMP 11/06/2024 (Approximate)   SpO2 97%   BMI 19.94 kg/m²   GENERAL: well developed, well nourished,in no apparent distress  SKIN: no rashes or suspicious lesions  EYES: Pupils round, reactive to light and accomodation.  EOMI.    HEAD: atraumatic, normocephalic.   NECK: supple, no bruits  LUNGS: clear to auscultation bilaterally, breathing is non labored  CARDIO: RRR without murmur  NEURO: Alert & Oriented x 3.  CN II-XII intact. Moving all 4 extremities without difficulty.   No facial droop.  No slurred speech.  Patella & Brachioradialis DTR intact bilaterally.      EXTREMITIES: no cyanosis, clubbing or edema  LYMPH: No cervical or supraclavicular lymphadenopathy.   PSYCH:  Speech, mood and affect are appropriate.       ASSESSMENT AND PLAN:   ASSESSMENT:   Encounter Diagnoses   Name Primary?    Dizziness Yes    Less than 8 weeks gestation of pregnancy (HCC)        PLAN:   -Patient  reports minor HA.  Discussed suspect dizziness, nausea, and vomiting associated with pregnancy, but unable to rule out life threatening conditions in a WIC setting.  ER advised as patient is reporting SOB with walking.    -Call OBGYN as well and advise of pregnancy  -We discussed to discontinue Exedrine Migraine.    -31 minutes spent reviewing chart, with patient, and documentation.     Requested Prescriptions      No prescriptions requested or ordered in this encounter     Risks, benefits, side effects of medication explained/discussed.    There are no Patient Instructions on file for this visit.

## 2024-11-11 NOTE — PROGRESS NOTES
eRnee Paris is a 28 year old female  Patient's last menstrual period was 2024 (approximate).   Chief Complaint   Patient presents with    Gyn Problem     Patient reports she found out she was pregnant at the walk-in clinic 45 minutes ago, went in for SOB since 24 .   LMP:unsure approx. 10/24 (irregular periods)    Vitals:   BP- 112/86  Pulse- 86  O2- 98%   .  Lmp 10/24/2024 - unsure due to irregular periods. Was not keeping track.     She is feeling nauseous.   Has history of headaches and vertigo. Was taking Execendrin - advise her to discontinue it.   For her vertigo - has seen ENT and done physical therapy in the past. Advise to follow up with neuro - info given.   OBSTETRICS HISTORY:  OB History    Para Term  AB Living   1 0 0 0 0 0   SAB IAB Ectopic Multiple Live Births   0 0 0 0 0      # Outcome Date GA Lbr Georges/2nd Weight Sex Type Anes PTL Lv   1 Current                GYNE HISTORY:  Periods are irregular,  Some months she would skip her period and other months she would get her period twice in one month.   History   Sexual Activity    Sexual activity: Yes    Partners: Male    Birth control/ protection: Condom                 MEDICAL HISTORY:  Past Medical History:    GERD (gastroesophageal reflux disease)    Migraines    Seasonal allergies       SURGICAL HISTORY:  History reviewed. No pertinent surgical history.    SOCIAL HISTORY:  Social History     Socioeconomic History    Marital status: Single     Spouse name: Not on file    Number of children: Not on file    Years of education: Not on file    Highest education level: Not on file   Occupational History    Not on file   Tobacco Use    Smoking status: Never    Smokeless tobacco: Never    Tobacco comments:     NON-SMOKER   Vaping Use    Vaping status: Never Used   Substance and Sexual Activity    Alcohol use: Not Currently     Alcohol/week: 0.0 standard drinks of alcohol    Drug use: No    Sexual activity: Yes      Partners: Male     Birth control/protection: Condom   Other Topics Concern     Service Not Asked    Blood Transfusions Not Asked    Caffeine Concern Yes     Comment: coffee 1 cup daily    Occupational Exposure Not Asked    Hobby Hazards Not Asked    Sleep Concern Not Asked    Stress Concern Not Asked    Weight Concern Not Asked    Special Diet Not Asked    Back Care Not Asked    Exercise No    Bike Helmet Not Asked    Seat Belt Yes    Self-Exams Not Asked   Social History Narrative    Not on file     Social Drivers of Health     Financial Resource Strain: Not on file   Food Insecurity: Not on file   Transportation Needs: Not on file   Stress: Not on file   Housing Stability: Not on file       FAMILY HISTORY:  Family History   Problem Relation Age of Onset    Cancer Mother 45        brain cancer    Migraines Mother     Anxiety Mother     Cancer Maternal Grandfather 70        lung       MEDICATIONS:    Current Outpatient Medications:     metoclopramide (REGLAN) 10 MG Oral Tab, Take 1 tablet (10 mg total) by mouth every 6 (six) hours as needed., Disp: 20 tablet, Rfl: 0    CELECOXIB 200 MG Oral Cap, TAKE 1 CAPSULE(200 MG) BY MOUTH DAILY AS NEEDED FOR PAIN, Disp: 30 capsule, Rfl: 1    cyclobenzaprine 5 MG Oral Tab, Take 1 tablet (5 mg total) by mouth nightly., Disp: 30 tablet, Rfl: 1    cyclobenzaprine 5 MG Oral Tab, Take 1 tablet (5 mg total) by mouth nightly., Disp: 30 tablet, Rfl: 1    levocetirizine 5 MG Oral Tab, Take 1 tablet (5 mg total) by mouth every evening., Disp: 90 tablet, Rfl: 4    fluticasone propionate 50 MCG/ACT Nasal Suspension, 2 sprays by Nasal route daily. (Patient not taking: Reported on 11/11/2024), Disp: 3 each, Rfl: 4    montelukast 10 MG Oral Tab, Take 1 tablet (10 mg total) by mouth nightly., Disp: 30 tablet, Rfl: 11    ALLERGIES:  Allergies[1]           Assessment & Plan:  1. Pregnancy examination or test, positive result (HCC)    - HCG, Beta Subunit, Quant; Future  - Progesterone;  Future    2. Irregular periods    - HCG, Beta Subunit, Quant; Future  - Progesterone; Future    3. Nausea    - metoclopramide (REGLAN) 10 MG Oral Tab; Take 1 tablet (10 mg total) by mouth every 6 (six) hours as needed.  Dispense: 20 tablet; Refill: 0    4. Vertigo      Advise her to start taking prenatal vitamins and folic acid.   Her 1st OB appointment is scheduled for 1/6/25             [1] No Known Allergies

## 2024-11-12 ENCOUNTER — TELEPHONE (OUTPATIENT)
Dept: OBGYN CLINIC | Facility: CLINIC | Age: 29
End: 2024-11-12

## 2024-11-12 ENCOUNTER — HOSPITAL ENCOUNTER (OUTPATIENT)
Dept: ULTRASOUND IMAGING | Facility: HOSPITAL | Age: 29
Discharge: HOME | End: 2024-11-12
Payer: COMMERCIAL

## 2024-11-12 ENCOUNTER — TELEPHONE (OUTPATIENT)
Facility: CLINIC | Age: 29
End: 2024-11-12

## 2024-11-12 DIAGNOSIS — O36.80X0 PREGNANCY OF UNKNOWN ANATOMIC LOCATION (HCC): Primary | ICD-10-CM

## 2024-11-12 DIAGNOSIS — O36.80X0 PREGNANCY OF UNKNOWN ANATOMIC LOCATION (HCC): ICD-10-CM

## 2024-11-12 PROCEDURE — 76801 OB US < 14 WKS SINGLE FETUS: CPT

## 2024-11-12 PROCEDURE — 76817 TRANSVAGINAL US OBSTETRIC: CPT

## 2024-11-12 NOTE — TELEPHONE ENCOUNTER
Spoke with [facility] radiology. OB ultrasound results:      CONCLUSION:  There is a single live intrauterine pregnancy with an estimated age of 6 weeks 6 days +/-4 days.     Will route to provider for review.

## 2024-11-13 ENCOUNTER — OFFICE VISIT (OUTPATIENT)
Dept: NEUROLOGY | Facility: CLINIC | Age: 29
End: 2024-11-13
Payer: COMMERCIAL

## 2024-11-13 VITALS
HEART RATE: 81 BPM | SYSTOLIC BLOOD PRESSURE: 92 MMHG | DIASTOLIC BLOOD PRESSURE: 56 MMHG | RESPIRATION RATE: 16 BRPM | WEIGHT: 134.94 LBS | BODY MASS INDEX: 20 KG/M2

## 2024-11-13 DIAGNOSIS — R42 VERTIGO: ICD-10-CM

## 2024-11-13 DIAGNOSIS — G43.009 MIGRAINE WITHOUT AURA, NOT INTRACTABLE, WITHOUT STATUS MIGRAINOSUS: Primary | ICD-10-CM

## 2024-11-13 PROCEDURE — 99214 OFFICE O/P EST MOD 30 MIN: CPT | Performed by: OTHER

## 2024-11-13 NOTE — PROGRESS NOTES
Neurology H&P    Renee Paris Patient Status:  No patient class for patient encounter    1995 MRN [id number]   Location [facility], [facility] Attending No att. providers found   Hosp Day # 0 PCP Modesta Schulte MD     Subjective:  Initial Clinic HPI 17  Renee Paris is a(n) 28 year old female with a PMH significant for migraine type headaches. She states that she started to have migraine headaches 4 years ago. She has a mother with migraines as well. She states that lately her headaches have worsened. She states that she has 2-3 headaches per month. She states that her headaches last all day long and sometimes into the next day. She is taking her mother Imitrex 50mg at onset of headache which she states helps. She takes 2-3 ibuprofen per day for headaches and states that she, in addition to her migraine type headaches gets a daily headache as well. Her migraine headaches are located in the frontal and occipital areas. She describes that pain as pounding and gets so bad that \"I lose my vision\". She states that she can awake at night with a migraine as well. She denies any visual aura. She endorses + photo and phonophobia. She gets blurry vision with her headaches and endorses + nausea but no vomiting. She denies nay numbness or tingling or weakness with her headache. She denies any slurred speech. She is a college student at [employer], [place] and is not sure that she misses school but occasionally misses work. She endorses worsening of her migraines with her menstrual cycle.  She denies any headaches today and feels well today.     Interim History:  Pt was last seen in clinic on 12/3/19.  Seen in clinic at that time for migraines and chronic vertigo.  She has been seen by   many specialist over the course of the last several years for this vertigo.  She saw Dr. Loera at [place] [facility] after seeing myself for migraines and vertigo. She states that she has had room spinning  vertigo for 2-3 years. This is constant. It always feels like the room is spinning. She has no pulsatile tinnitus or hearing loss. She has seen Britt Jorge at the [place] [facility] for this and per charts her inner ear function was normal and the thought was that she may have vertiginous migraine. She states that she has had dizziness her whole life but this vertigo is different. She gets headaches about 4 times per week. She states she recently found out that she is pregnant and so has stopped her migraine medications. She reports 4 migraines per week.     Current Medications:  Current Outpatient Medications   Medication Sig Dispense Refill    metoclopramide (REGLAN) 10 MG Oral Tab Take 1 tablet (10 mg total) by mouth every 6 (six) hours as needed. (Patient not taking: Reported on 11/13/2024) 20 tablet 0       Problem List:  Patient Active Problem List   Diagnosis    Migraine without aura, not intractable, without status migrainosus    Anxiety    Imbalance       PMHx:  Past Medical History:    GERD (gastroesophageal reflux disease)    Migraines    Seasonal allergies       PSHx:  No past surgical history on file.    SocHx:  Social History     Socioeconomic History    Marital status: Single   Tobacco Use    Smoking status: Never    Smokeless tobacco: Never    Tobacco comments:     NON-SMOKER   Vaping Use    Vaping status: Never Used   Substance and Sexual Activity    Alcohol use: Not Currently     Alcohol/week: 0.0 standard drinks of alcohol    Drug use: No    Sexual activity: Yes     Partners: Male     Birth control/protection: Condom   Other Topics Concern    Caffeine Concern No     Comment: rare    Exercise No    Seat Belt Yes       Family History:  Family History   Problem Relation Age of Onset    Cancer Mother 45        brain cancer    Migraines Mother     Anxiety Mother     Cancer Maternal Grandfather 70        lung       ROS:  Gen: no unexplained weight loss  Vision: no vision changes, no new blurry or double  vision  Head and Neck: no eye or ear discharge  Pulmonary: no SOB, no new cough  CV: no chest pain, no new lower extremity swelling  GI: no constipation or abdominal pain  : denies frequent urination or difficulty urinating   Heme: no new bruising, no unexplained fevers or chills  Endocrine: no unexplained weight loss or gain, no new fatigue  Musculoskeletal: denies back pain, denies joint pain  Psych: denies depression   Skin: denies any new masses, rashes, lumps or bruising    Objective/Physical Exam:    Vital Signs:  Blood pressure 92/56, pulse 81, resp. rate 16, weight 134 lb 14.7 oz (61.2 kg), last menstrual period 11/06/2024, not currently breastfeeding.    Gen: Awake and in no apparent distress  HEENT: moist mucus membranes  Neck: Supple  Cardiovascular: Regular rate and rhythm, no murmur  Pulm: CTAB  GI: non-tender, normal bowel sounds  Skin: normal, dry  Extremities: No clubbing or cyanosis      Neurologic:  MENTAL STATUS: alert, ox3, normal attention, language and fund of knowledge.      CRANIAL NERVES II to XII: PERRLA, no ptosis or diplopia, EOM intact, facial sensation intact, strong eye closure and lower facial muscles & jaw muscles; palate elevation intact, no dysarthria, no tongue fasciculations or atrophy, strong shoulder shrug.    MOTOR EXAMINATION: normal tone, no fasciculations, normal strength throughout in UEs and LEs     SENSORY EXAMINATION:  UE: intact to light touch, pinprick is intact  LE: intact to light touch, pinprick is intact    COORDINATION:  No dysmetria, or intention tremors; normal MONA    REFLEXES: 2+ at biceps, 2+ triceps, 2+ at patella, 2+ at the ankles. Toes downgoing    GAIT: normal stance, normal toe gait and heel gait, tandem well.    Romberg's: negative    Labs:       Imaging:  MRI Brain IAC  Impression   CONCLUSION:  Unremarkable MRI of the brain.     MRI C spine 2022  Impression   CONCLUSION:       Minimal degenerative disc disease at C5-6.     Assessment:  This is a 28  y/o female with migraine headaches, and vertigo.  She has seen in the past and we had tried venlafaxine for her migraines plus anxiety.  She states that that actually worked very well and pretty much resolved her migraine headaches.  She states that she stopped this medication some years after seeing me as I had not seen her since 2019 and that her migraines never really resumed.  She will get normal tension headaches but not migraines per se.  Over the past couple years she has been getting more migraines and also vertigo.  She seen other neurologist including Dr. Clayton at the [place] [facility] and Dr. Loera at [facility] for migraines and vertigo.  She states that they have not been able to figure out what is the cause of her vertigo.  She had an MRI IAC and an MRI of the C-spine which as noted above are grossly unremarkable.  Nothing to explain her vertigo.  She very well could have vertiginous migraine.  She is currently pregnant.  I am not able to prescribe her any medications for migraine during her pregnancy.  Discussed this with her today.  She can come see me when she has delivered and is done breast-feeding.  We could certainly consider restarting migraine medication such as the venlafaxine that she was on.  She states that she is also looked into Botox.  She is interested in possibly trying Botox.  She gets for migraines per week at this time. This may be an option for her.    Plan:  1.Migraines  - possibly vertiginous migraine  - MRI/IAC and MRI C spine personally reviewed with the patient today  - Previous neurology notes from Dr. Loera and Dr. De La Torre reviewed        - Counseled to take OTC analgesics no more than 2-3 days per week  - Exercise 3 times per week at least 30 minutes  - Caffeine only in moderation  - Encouraged proper hydration 6-8 glasses H2O per day  - Keep a headache diary to bring to next clinic appointment  - Encouraged proper sleep hygiene   - Keep note of possible food  triggers  (ripe cheeses, MSG etc.)  - Limit EtOH consumption (no more than 2 beverages per day for men and 1 for women)       Domingo Garlnad,   Neurology

## 2024-11-13 NOTE — PROGRESS NOTES
Pt reports Vertigo for the last two years, daily, \"all day long\".  Pt reports she has tried PT. Has seen ENT.    Patient is currently pregnant.

## 2024-11-13 NOTE — PROGRESS NOTES
Spoke with patient and advised of results and recommendations to keep FOB appointment. Per patient, she was able to determine her LMP was 9/15/24. Also, her appointment is not scheduled until January and will need something sooner. Patient transferred to  for rescheduling. Notes routed to provider for additional recommendations based on new LMP date.

## 2024-11-13 NOTE — PATIENT INSTRUCTIONS
After your visit at the [facility] today,  please direct any follow up questions or medication needs to the staff in our [place] office so that your concerns may be promptly addressed.  We are available through [employer] or at the numbers below:    The phone number is:   [phone] option #1    The fax number is:  [phone]    Your pharmacy should also send any requests electronically to the [place] office.  Refill policies:    Allow 2-3 business days for refills; controlled substances may take longer.  Contact your pharmacy at least 5 days prior to running out of medication and have them send an electronic request or submit request through the “request refill” option in your [employer] account.  Refills are not addressed on weekends; covering physicians do not authorize routine medications on weekends.  No narcotics or controlled substances are refilled after noon on Fridays or by on call physicians.  By law, narcotics must be electronically prescribed.  A 30 day supply with no refills is the maximum allowed.  If your prescription is due for a refill, you may be due for a follow up appointment.  To best provide you care, patients receiving routine medications need to be seen at least once a year.  Patients receiving narcotic/controlled substance medications need to be seen at least once every 3 months.  In the event that your preferred pharmacy does not have the requested medication in stock (e.g. Backordered), it is your responsibility to find another pharmacy that has the requested medication available.  We will gladly send a new prescription to that pharmacy at your request.    Scheduling Tests:    If your physician has ordered radiology tests such as MRI or CT scans, please contact Central Scheduling at [phone] right away to schedule the test.  Once scheduled, the [facility] Centralized Referral Team will work with your insurance carrier to obtain pre-certification or prior authorization.   Depending on your insurance carrier, approval may take 3-10 days.  It is highly recommended patients assure they have received an authorization before having a test performed.  If test is done without insurance authorization, patient may be responsible for the entire amount billed.      Precertification and Prior Authorizations:  If your physician has recommended that you have a procedure or additional testing performed the [facility] Centralized Referral Team will contact your insurance carrier to obtain pre-certification or prior authorization.    You are strongly encouraged to contact your insurance carrier to verify that your procedure/test has been approved and is a COVERED benefit.  Although the [facility] Centralized Referral Team does its due diligence, the insurance carrier gives the disclaimer that \"Although the procedure is authorized, this does not guarantee payment.\"    Ultimately the patient is responsible for payment.   Thank you for your understanding in this matter.  Paperwork Completion:  If you require FMLA or disability paperwork for your recovery, please make sure to either drop it off or have it faxed to our office at [phone]. Be sure the form has your name and date of birth on it.  The form will be faxed to our Forms Department and they will complete it for you.  There is a 25$ fee for all forms that need to be filled out.  Please be aware there is a 10-14 day turnaround time.  You will need to sign a release of information (SUSAN) form if your paperwork does not come with one.  You may call the Forms Department with any questions at [phone].  Their fax number is [phone].

## 2024-11-18 ENCOUNTER — INITIAL PRENATAL (OUTPATIENT)
Dept: OBGYN CLINIC | Facility: CLINIC | Age: 29
End: 2024-11-18
Payer: COMMERCIAL

## 2024-11-18 ENCOUNTER — LAB ENCOUNTER (OUTPATIENT)
Dept: LAB | Age: 29
End: 2024-11-18
Payer: COMMERCIAL

## 2024-11-18 VITALS
HEART RATE: 71 BPM | WEIGHT: 138.31 LBS | SYSTOLIC BLOOD PRESSURE: 90 MMHG | BODY MASS INDEX: 20.49 KG/M2 | HEIGHT: 69 IN | DIASTOLIC BLOOD PRESSURE: 58 MMHG

## 2024-11-18 DIAGNOSIS — Z34.91 INITIAL OBSTETRIC VISIT IN FIRST TRIMESTER (HCC): ICD-10-CM

## 2024-11-18 DIAGNOSIS — Z34.91 INITIAL OBSTETRIC VISIT IN FIRST TRIMESTER (HCC): Primary | ICD-10-CM

## 2024-11-18 LAB
ANTIBODY SCREEN: NEGATIVE
BASOPHILS # BLD AUTO: 0.06 X10(3) UL (ref 0–0.2)
BASOPHILS NFR BLD AUTO: 0.5 %
BILIRUB UR QL STRIP.AUTO: NEGATIVE
CLARITY UR REFRACT.AUTO: CLEAR
COLOR UR AUTO: COLORLESS
DEPRECATED HBV CORE AB SER IA-ACNC: 53 NG/ML
EOSINOPHIL # BLD AUTO: 0.08 X10(3) UL (ref 0–0.7)
EOSINOPHIL NFR BLD AUTO: 0.7 %
ERYTHROCYTE [DISTWIDTH] IN BLOOD BY AUTOMATED COUNT: 11.5 %
GLUCOSE UR STRIP.AUTO-MCNC: NORMAL MG/DL
HBV SURFACE AG SER-ACNC: 0.21 [IU]/L
HBV SURFACE AG SERPL QL IA: NONREACTIVE
HCT VFR BLD AUTO: 38.5 %
HCV AB SERPL QL IA: NONREACTIVE
HGB BLD-MCNC: 13.1 G/DL
IMM GRANULOCYTES # BLD AUTO: 0.06 X10(3) UL (ref 0–1)
IMM GRANULOCYTES NFR BLD: 0.5 %
KETONES UR STRIP.AUTO-MCNC: NEGATIVE MG/DL
LEUKOCYTE ESTERASE UR QL STRIP.AUTO: 75
LYMPHOCYTES # BLD AUTO: 2.17 X10(3) UL (ref 1–4)
LYMPHOCYTES NFR BLD AUTO: 18.4 %
MCH RBC QN AUTO: 30 PG (ref 26–34)
MCHC RBC AUTO-ENTMCNC: 34 G/DL (ref 31–37)
MCV RBC AUTO: 88.1 FL
MONOCYTES # BLD AUTO: 1.15 X10(3) UL (ref 0.1–1)
MONOCYTES NFR BLD AUTO: 9.7 %
NEUTROPHILS # BLD AUTO: 8.29 X10 (3) UL (ref 1.5–7.7)
NEUTROPHILS # BLD AUTO: 8.29 X10(3) UL (ref 1.5–7.7)
NEUTROPHILS NFR BLD AUTO: 70.2 %
NITRITE UR QL STRIP.AUTO: NEGATIVE
PH UR STRIP.AUTO: 6.5 [PH] (ref 5–8)
PLATELET # BLD AUTO: 343 10(3)UL (ref 150–450)
PROT UR STRIP.AUTO-MCNC: NEGATIVE MG/DL
RBC # BLD AUTO: 4.37 X10(6)UL
RBC UR QL AUTO: NEGATIVE
RH BLOOD TYPE: POSITIVE
RUBV IGG SER QL: POSITIVE
RUBV IGG SER-ACNC: 10 IU/ML (ref 10–?)
SP GR UR STRIP.AUTO: <1.005 (ref 1–1.03)
T PALLIDUM AB SER QL IA: NONREACTIVE
UROBILINOGEN UR STRIP.AUTO-MCNC: NORMAL MG/DL
WBC # BLD AUTO: 11.8 X10(3) UL (ref 4–11)

## 2024-11-18 PROCEDURE — 82728 ASSAY OF FERRITIN: CPT

## 2024-11-18 PROCEDURE — 87340 HEPATITIS B SURFACE AG IA: CPT

## 2024-11-18 PROCEDURE — 87086 URINE CULTURE/COLONY COUNT: CPT

## 2024-11-18 PROCEDURE — 36415 COLL VENOUS BLD VENIPUNCTURE: CPT

## 2024-11-18 PROCEDURE — 87591 N.GONORRHOEAE DNA AMP PROB: CPT

## 2024-11-18 PROCEDURE — 86803 HEPATITIS C AB TEST: CPT

## 2024-11-18 PROCEDURE — 86901 BLOOD TYPING SEROLOGIC RH(D): CPT

## 2024-11-18 PROCEDURE — 81001 URINALYSIS AUTO W/SCOPE: CPT

## 2024-11-18 PROCEDURE — 85025 COMPLETE CBC W/AUTO DIFF WBC: CPT

## 2024-11-18 PROCEDURE — 87801 DETECT AGNT MULT DNA AMPLI: CPT

## 2024-11-18 PROCEDURE — 86780 TREPONEMA PALLIDUM: CPT

## 2024-11-18 PROCEDURE — 86850 RBC ANTIBODY SCREEN: CPT

## 2024-11-18 PROCEDURE — 86900 BLOOD TYPING SEROLOGIC ABO: CPT

## 2024-11-18 PROCEDURE — 86762 RUBELLA ANTIBODY: CPT

## 2024-11-18 PROCEDURE — 87491 CHLMYD TRACH DNA AMP PROBE: CPT

## 2024-11-18 PROCEDURE — 87389 HIV-1 AG W/HIV-1&-2 AB AG IA: CPT

## 2024-11-18 RX ORDER — ONDANSETRON 4 MG/1
4 TABLET, FILM COATED ORAL EVERY 8 HOURS PRN
Qty: 30 TABLET | Refills: 0 | Status: SHIPPED | OUTPATIENT
Start: 2024-11-18

## 2024-11-18 NOTE — PROGRESS NOTES
Initial OB - 7w5d       OBhx -    PMHx - . Denies blood transfusion, HIV, hepatitis, HSV, VTE, or congenital heart defects   Psurghx - denies   Last pap 2023 NILM     28 year old , EDC by 1st tri US (irregular periods)   -NIPT & Carrier discussed, wants, ask at next visit   -Partner: Dimas      Vertigo   -has seen ENT  -has completed PT   -Neurology: Dr Garland, no further rec. Will follow up postpartum     N&V   -has tried Reglan with no relief  -is wearing a anti nausea wrist band - helps a little   -Zofran Rx sent to pharmacy       She is requesting a letter to be off two weeks due to nausea, vomiting, and vertigo - Letter given today. Works at [employer] in the [employer] department.     RTC 4 wk, next appt with Obs

## 2024-11-19 LAB
C TRACH DNA SPEC QL NAA+PROBE: NEGATIVE
N GONORRHOEA DNA SPEC QL NAA+PROBE: NEGATIVE

## 2024-11-21 ENCOUNTER — PATIENT MESSAGE (OUTPATIENT)
Dept: OBGYN CLINIC | Facility: CLINIC | Age: 29
End: 2024-11-21

## 2024-11-22 ENCOUNTER — PATIENT MESSAGE (OUTPATIENT)
Dept: OBGYN CLINIC | Facility: CLINIC | Age: 29
End: 2024-11-22

## 2024-11-25 ENCOUNTER — TELEPHONE (OUTPATIENT)
Dept: OBGYN CLINIC | Facility: CLINIC | Age: 29
End: 2024-11-25

## 2024-11-25 RX ORDER — ONDANSETRON 4 MG/1
4 TABLET, FILM COATED ORAL EVERY 8 HOURS PRN
Qty: 30 TABLET | Refills: 0 | Status: SHIPPED | OUTPATIENT
Start: 2024-11-25

## 2024-11-25 RX ORDER — ONDANSETRON 4 MG/1
8 TABLET, FILM COATED ORAL EVERY 8 HOURS PRN
Qty: 30 TABLET | Refills: 3 | Status: SHIPPED | OUTPATIENT
Start: 2024-11-25

## 2024-11-25 NOTE — TELEPHONE ENCOUNTER
Disability forms received in forms department. [employer] sent for Release of Information. Logged for processing.

## 2024-11-27 ENCOUNTER — PATIENT MESSAGE (OUTPATIENT)
Facility: CLINIC | Age: 29
End: 2024-11-27

## 2024-11-29 NOTE — TELEPHONE ENCOUNTER
Completed SUSAN was recevied. Forms were faxed & inter-office mailed to Forms Department.     Pt advised to call Forms Department for any questions or concerns at [phone].

## 2024-12-02 NOTE — TELEPHONE ENCOUNTER
Patient returning phone call. Patient states that she has been off work for nausea from her prgnancy. There are 2 letters in patient chart from provider regarding her being off work with dates, patient states correct dates are 11/18/24-12/2/24.     Type of Leave: short term disability   Reason for Leave: nausea/pregnancy   Start date of leave: 11/18/24   End date of leave: 12/2/24   How many flare ups per month/length?:  How many appts per month/length?:   Was Fee and Turnaround info Given?: yes

## 2024-12-02 NOTE — TELEPHONE ENCOUNTER
Called patient to obtain details, Left message to call back.    Please obtain details/task provider

## 2024-12-03 NOTE — TELEPHONE ENCOUNTER
Jennifer HOLLIDAY,      Please sign off on form if you agree to: disability for Nausea/pregnancy     -Signature page will be the first page scanned  -From your Inbasket, Highlight the patient and click Chart   -Double click the 11/25/24 Forms Completion telephone encounter  -Scroll down to the Media section   -Click the blue Hyperlink: disability Jose HOLLIDAY 12/3/24  -Click Acknowledge located in the top right ribbon/menu   -Drag the mouse into the blank space of the document and a + sign will appear. Left click to   electronically sign the document.  -Once signed, simply exit out of the screen and you signature will be saved.     Thank you,  Evelyne

## 2024-12-04 NOTE — TELEPHONE ENCOUNTER
Forms E faxed     Efax Date:12/4/24  Time fax sent: 9:05 am  Confirmation time : 9:30  JOB ID:[id number]    Disability Forms completed and signed faxed to [facility] [phone] confirmation received.

## 2024-12-16 ENCOUNTER — ROUTINE PRENATAL (OUTPATIENT)
Facility: CLINIC | Age: 29
End: 2024-12-16
Payer: COMMERCIAL

## 2024-12-16 VITALS
BODY MASS INDEX: 20.76 KG/M2 | HEIGHT: 69 IN | WEIGHT: 140.19 LBS | DIASTOLIC BLOOD PRESSURE: 54 MMHG | SYSTOLIC BLOOD PRESSURE: 102 MMHG | HEART RATE: 75 BPM

## 2024-12-16 DIAGNOSIS — Z36.0 ENCOUNTER FOR ANTENATAL SCREENING FOR CHROMOSOMAL ANOMALIES (HCC): ICD-10-CM

## 2024-12-16 DIAGNOSIS — Z31.430 ENCOUNTER OF FEMALE FOR TESTING FOR GENETIC DISEASE CARRIER STATUS FOR PROCREATIVE MANAGEMENT: Primary | ICD-10-CM

## 2024-12-16 RX ORDER — CHOLECALCIFEROL (VITAMIN D3) 25 MCG
1 TABLET,CHEWABLE ORAL DAILY
COMMUNITY

## 2024-12-16 NOTE — PROGRESS NOTES
BOBBY 11w5d    Nausea still present but zofran helping. Advised to take B6 and unisom and then zofran to help keep things at bay. Also to avoid having stomach empty - so eating small frequent meals.       29 year old  dated by 1st tri US (irregular periods)     #Routine prenatal care  - NIPT: ordered  - Carrier screening: ordered   - Partner/FOB: Dimas    #Vertigo   -has seen ENT  -has completed PT   -Neurology: Dr Garland, no further rec. Will follow up postpartum      #N&V   -has tried Reglan with no relief  -is wearing an anti nausea wrist band - helps a little   -zofran Rx helping  -advised to add Vit B6 and unisom too

## 2024-12-18 ENCOUNTER — NURSE ONLY (OUTPATIENT)
Facility: CLINIC | Age: 29
End: 2024-12-18
Payer: COMMERCIAL

## 2024-12-18 ENCOUNTER — TELEPHONE (OUTPATIENT)
Facility: CLINIC | Age: 29
End: 2024-12-18

## 2024-12-18 VITALS
HEART RATE: 66 BPM | HEIGHT: 69 IN | DIASTOLIC BLOOD PRESSURE: 60 MMHG | BODY MASS INDEX: 20.62 KG/M2 | WEIGHT: 139.19 LBS | SYSTOLIC BLOOD PRESSURE: 100 MMHG

## 2024-12-18 PROCEDURE — 36415 COLL VENOUS BLD VENIPUNCTURE: CPT | Performed by: STUDENT IN AN ORGANIZED HEALTH CARE EDUCATION/TRAINING PROGRAM

## 2024-12-18 NOTE — PROGRESS NOTES
12 week 0 day chavez IUP.   Patients name &  verified with patient   Lab tubes labeled   NIPT/carrier screen lab drawn  Patient tolerated well. Test given to PSR for processing and send out.   Ashely information given and patient instructed to call in 14 days to discuss results. Patient verbalized understanding.

## 2024-12-20 ENCOUNTER — TELEPHONE (OUTPATIENT)
Facility: CLINIC | Age: 29
End: 2024-12-20

## 2024-12-20 ENCOUNTER — ROUTINE PRENATAL (OUTPATIENT)
Facility: CLINIC | Age: 29
End: 2024-12-20
Payer: COMMERCIAL

## 2024-12-20 VITALS
HEART RATE: 78 BPM | HEIGHT: 69 IN | WEIGHT: 140.63 LBS | BODY MASS INDEX: 20.83 KG/M2 | SYSTOLIC BLOOD PRESSURE: 100 MMHG | DIASTOLIC BLOOD PRESSURE: 58 MMHG

## 2024-12-20 DIAGNOSIS — Z3A.12 12 WEEKS GESTATION OF PREGNANCY (HCC): ICD-10-CM

## 2024-12-20 DIAGNOSIS — Z34.90 PRENATAL CARE, ANTEPARTUM (HCC): Primary | ICD-10-CM

## 2024-12-20 PROCEDURE — 99213 OFFICE O/P EST LOW 20 MIN: CPT | Performed by: STUDENT IN AN ORGANIZED HEALTH CARE EDUCATION/TRAINING PROGRAM

## 2024-12-20 PROCEDURE — 99459 PELVIC EXAMINATION: CPT | Performed by: STUDENT IN AN ORGANIZED HEALTH CARE EDUCATION/TRAINING PROGRAM

## 2024-12-20 NOTE — TELEPHONE ENCOUNTER
12 2     Patient c/o bright red bleeding this morning. Patient went to the bathroom and there was a drop of red blood in the toilet and when patient wiped there was bright red blood on the toilet. Currently, the patient is spotting with brown blood. Patient states she is currently experiencing 2-3/10 lower abdominal cramping.     Denies intercourse, heavy lifting (max 20lbs at work), physical activity.     Patient states she has had some constipation, and mostly denies UTI symptoms. Patient states she could maybe feel some burning while urinating but denies foul odor and cloudy urine.     Scheduled patient to come in to see Dr. Naidu today.   
Pt says she woke up bleeding, currently 12 wks pregnant  
Never smoker

## 2024-12-20 NOTE — PROGRESS NOTES
Problem visit 12.2 - early pregnancy bleeding    BRB this morning.     Patient went to the bathroom and there was a drop of red blood in the toilet and when patient wiped there was bright red blood on toilet paper. Currently, the patient is spotting with brown blood. Patient states she is currently experiencing 2-3/10 lower abdominal cramping. Like light period cramps.       Denies intercourse, heavy lifting (max 20lbs at work), physical activity.      Patient states she has had some constipation, and mostly denies UTI symptoms. Patient states she could maybe feel some burning while urinating but denies foul odor and cloudy urine.     On speculum exam: 1 cm friable area of ectropion in the left posterior quadrant.  No blood from the os, ALL from friable cervical area.  Showed patient [place] swab with bright red blood.  Chlamydia and gonorrhea negative.  FHTs 130s-140s.    Offered silver nitrate, patient declined.  Will let us know if she changes her mind.  Reports that she had similar issue outside of pregnancy.  Warned patient that she would likely having spotting next few days and after intercourse.     29 year old  dated by  tri US (irregular periods)     #Routine prenatal care  - NIPT and carrier pending  - Partner/FOB: Dimas    #Vertigo   -has seen ENT  -has completed PT   -Neurology: Dr Garland, no further rec. Will follow up postpartum      #N&V   -has tried Reglan with no relief  -is wearing an anti nausea wrist band - helps a little   -zofran Rx helping  -advised to add Vit B6 and unisom too    #friable cervical ectropion  12.2: problem visit for bleeding - left posterior cervical quadrant with friable ectropion.  +FHTs.  Declined silver nitrate.  Will let us know if worsens.

## 2025-01-07 ENCOUNTER — TELEPHONE (OUTPATIENT)
Facility: CLINIC | Age: 30
End: 2025-01-07

## 2025-01-14 ENCOUNTER — ROUTINE PRENATAL (OUTPATIENT)
Facility: CLINIC | Age: 30
End: 2025-01-14
Payer: COMMERCIAL

## 2025-01-14 VITALS
SYSTOLIC BLOOD PRESSURE: 102 MMHG | BODY MASS INDEX: 21.18 KG/M2 | DIASTOLIC BLOOD PRESSURE: 58 MMHG | HEART RATE: 82 BPM | HEIGHT: 69 IN | WEIGHT: 143 LBS

## 2025-01-14 DIAGNOSIS — Z36.89 ENCOUNTER FOR FETAL ANATOMIC SURVEY (HCC): Primary | ICD-10-CM

## 2025-01-14 NOTE — PROGRESS NOTES
Colby 15w6d    She is doing ok, nausea has improved  -anatomy scan discussed and ordered     MATTY 7/2/2025 dated by 1st tri US (irregular periods)      #Routine prenatal care  - NIPT: neg  - Carrier screening: Carrier for CF -partner has completed salvia kit -results pending   - Partner/FOB: Dimas     #Vertigo   -has seen ENT  -has completed PT   -Neurology: Dr Garland, no further rec. Will follow up postpartum   -plans to see her orthodontist, wearing Invisalign helps     #nausea  -taking 8 mg zofran BID   -able to keep food and liquids down - has improved, still vomiting, twice a week now.      #N&V   -has tried Reglan with no relief  -is wearing an anti nausea wrist band - helps a little   -zofran Rx helping  -advised to add Vit B6 and unisom too  -taking 8 mg zofran BID   -able to keep food and liquids down - has improved, still vomiting, twice a week now.

## 2025-01-31 ENCOUNTER — TELEPHONE (OUTPATIENT)
Facility: CLINIC | Age: 30
End: 2025-01-31

## 2025-01-31 NOTE — TELEPHONE ENCOUNTER
Patient will check with her partner to make sure the kit was send back. Will call office if replacement kit is needed.

## 2025-01-31 NOTE — TELEPHONE ENCOUNTER
Left message for patient to follow up on partner testing ([facility]). According to [employer] specimen was not received as of 1/31/25.

## 2025-02-03 ENCOUNTER — TELEPHONE (OUTPATIENT)
Facility: CLINIC | Age: 30
End: 2025-02-03

## 2025-02-03 NOTE — TELEPHONE ENCOUNTER
Spoke with patient. Let her know that I am waiting on provider's recommendations. Once I hear back, I will be in touch. Understanding verbalized.     See Patient message encounter.

## 2025-02-07 ENCOUNTER — HOSPITAL ENCOUNTER (EMERGENCY)
Facility: HOSPITAL | Age: 30
Discharge: HOME | End: 2025-02-07
Attending: EMERGENCY MEDICINE
Payer: COMMERCIAL

## 2025-02-07 ENCOUNTER — ROUTINE PRENATAL (OUTPATIENT)
Facility: CLINIC | Age: 30
End: 2025-02-07
Payer: COMMERCIAL

## 2025-02-07 VITALS
RESPIRATION RATE: 18 BRPM | BODY MASS INDEX: 22.22 KG/M2 | WEIGHT: 150 LBS | OXYGEN SATURATION: 98 % | HEART RATE: 98 BPM | DIASTOLIC BLOOD PRESSURE: 72 MMHG | HEIGHT: 69 IN | TEMPERATURE: 98 F | SYSTOLIC BLOOD PRESSURE: 122 MMHG

## 2025-02-07 VITALS
BODY MASS INDEX: 22.27 KG/M2 | HEART RATE: 101 BPM | HEIGHT: 69 IN | DIASTOLIC BLOOD PRESSURE: 60 MMHG | SYSTOLIC BLOOD PRESSURE: 100 MMHG | WEIGHT: 150.38 LBS

## 2025-02-07 DIAGNOSIS — O21.9 NAUSEA AND VOMITING OF PREGNANCY, ANTEPARTUM (HCC): ICD-10-CM

## 2025-02-07 DIAGNOSIS — Z3A.19 19 WEEKS GESTATION OF PREGNANCY (HCC): Primary | ICD-10-CM

## 2025-02-07 DIAGNOSIS — R51.9 NONINTRACTABLE HEADACHE, UNSPECIFIED CHRONICITY PATTERN, UNSPECIFIED HEADACHE TYPE: Primary | ICD-10-CM

## 2025-02-07 PROCEDURE — 99282 EMERGENCY DEPT VISIT SF MDM: CPT

## 2025-02-07 NOTE — ED PROVIDER NOTES
Patient Seen in: [facility] Emergency Department      History     Chief Complaint   Patient presents with    Headache     Stated Complaint: HA and nausea, 19 wks preg    Subjective:   HPI      29-year-old female presents reporting she is 19 weeks pregnant and having headaches with nausea and vomiting.  This has been going on over the last couple of weeks.  She reports a chronic history of headaches.  Says she followed up with her neurologist and was told because of the pregnancy they could not put her on the typical medications.  She saw her OB/GYN today and told her she needed an extension on her work notice.  She was directed to the emergency room for evaluation.  She denies any falls or head injuries.  No fevers.  No neurologic symptoms.  Denies any significant change in these over several weeks.  She says she is not really looking for a workup today and just needs a note to excuse her from work for the next few days until her next OB/GYN appointment.    Objective:     Past Medical History:    GERD (gastroesophageal reflux disease)    Migraines    Screening for genetic disease carrier status    Carrier Screen = CARRIER for Cystic Fibrosis    Seasonal allergies              History reviewed. No pertinent surgical history.             Social History     Socioeconomic History    Marital status: Single   Tobacco Use    Smoking status: Never    Smokeless tobacco: Never    Tobacco comments:     NON-SMOKER   Vaping Use    Vaping status: Never Used   Substance and Sexual Activity    Alcohol use: Not Currently     Alcohol/week: 0.0 standard drinks of alcohol    Drug use: No    Sexual activity: Yes     Partners: Male     Birth control/protection: Condom   Other Topics Concern    Caffeine Concern No     Comment: rare    Exercise No    Seat Belt Yes                  Physical Exam     ED Triage Vitals [02/07/25 1215]   /72   Pulse 100   Resp 18   Temp 98.1 °F (36.7 °C)   Temp src Oral   SpO2 98 %   O2 Device None  (Room air)       Current Vitals:   Vital Signs  BP: 122/72  Pulse: 98  Resp: 18  Temp: 98.1 °F (36.7 °C)  Temp src: Oral    Oxygen Therapy  SpO2: 98 %  O2 Device: None (Room air)        Physical Exam  General:  Vitals as listed.  No acute distress   HEENT: Head atraumatic.  No palpable masses, contusions.  Neck: No significant tenderness on palpation into the musculature.  Neuro: Alert oriented and nonfocal   Skin: no rashes or nodules    ED Course   Labs Reviewed - No data to display                MDM      Persistent headaches through pregnancy.  Follows with neurology.  Does not want any medication at this time.  Is not getting relief with Tylenol at home.  Sent by OB/GYN for evaluation after she requested additional time off of work    Differential includes but is not limited to migraine headaches, preeclampsia, intracranial hemorrhage, a life threat.    Patient has no hypertension to suggest preeclampsia.  No trauma or symptoms that would suggest intracranial hemorrhage.  History of migraines.  She is quite comfortable appearing at this time.  She says it fluctuates in intensity and she does not feel she can go to work with this and requesting a work note which I provided.  Instructed to continue follow-up with her OB/GYN and neurology team.  Should return to the emergency room with neurologic deficits, fevers, significantly worsening symptoms or any concerns.        Medical Decision Making      Disposition and Plan     Clinical Impression:  1. Nonintractable headache, unspecified chronicity pattern, unspecified headache type         Disposition:  Discharge  2/7/2025  2:16 pm    Follow-up:  Modesta Schulte MD  [address] [address]  [phone]    Schedule an appointment as soon as possible for a visit            Medications Prescribed:  Discharge Medication List as of 2/7/2025  2:23 PM              Supplementary Documentation:

## 2025-02-07 NOTE — ED INITIAL ASSESSMENT (HPI)
Patient said she is here for work note . Complaining of  migraine  headache and nausea since few days . Patient is 19 weeks pregnant seen by ob/gyne today  sent here to extend her sick leave . Patient is not going going for work from January 28th.

## 2025-02-07 NOTE — PROGRESS NOTES
Colby 19w2d    She is still having nausea, but the zofran is helping. For some days, her nausea and vomiting can be difficult to manage with zofran, then she will get a headache. Her headache is so painful she has trouble getting out of bed. Laying down helps. She has tried Tylenol, it does not help. She has followed up with neurology - see below.   Offered to change her anti emetic medication, she declines.   She has gained 7 pounds since her last visit.   Gave her a letter excusing her from work from the dates requested 1/28/25-2/7/25.   Recommended her to go to ED for her symptoms since she is unable to go to work.       MATTY 7/2/2025 dated by 1st tri  (irregular periods)      #Routine prenatal care  - NIPT: neg  - Carrier screening: Carrier for CF -partner has completed salvia kit -results pending   - Partner/FOB: Dimas     #Vertigo   -has seen ENT  -has completed PT   -Neurology: Dr Garland, no further rec. Will follow up postpartum   -plans to see her orthodontist, wearing Invisalign helps      #nausea  -taking 8 mg zofran BID   -able to keep food and liquids down - has improved, still vomiting, twice a week now.      #N&V   -has tried Reglan with no relief  -is wearing an anti nausea wrist band - helps a little   -zofran Rx helping  -advised to add Vit B6 and unisom too  -taking 8 mg zofran BID   -able to keep food and liquids down - has improved, still vomiting, twice a week now.

## 2025-02-11 ENCOUNTER — TELEPHONE (OUTPATIENT)
Dept: OBGYN CLINIC | Facility: CLINIC | Age: 30
End: 2025-02-11

## 2025-02-11 NOTE — TELEPHONE ENCOUNTER
Received short term disability forms. Patient was instructed to discuss with provider on her next appointment per TE 2/3. Patient requesting disability for pregnancy symptoms.   Sent [employer] message for authorization. Logged for processing.

## 2025-02-13 ENCOUNTER — ULTRASOUND ENCOUNTER (OUTPATIENT)
Facility: CLINIC | Age: 30
End: 2025-02-13
Payer: COMMERCIAL

## 2025-02-13 ENCOUNTER — ROUTINE PRENATAL (OUTPATIENT)
Facility: CLINIC | Age: 30
End: 2025-02-13
Payer: COMMERCIAL

## 2025-02-13 VITALS
HEART RATE: 70 BPM | BODY MASS INDEX: 22.61 KG/M2 | SYSTOLIC BLOOD PRESSURE: 102 MMHG | WEIGHT: 152.63 LBS | DIASTOLIC BLOOD PRESSURE: 62 MMHG | HEIGHT: 69 IN

## 2025-02-13 DIAGNOSIS — Z3A.20 20 WEEKS GESTATION OF PREGNANCY (HCC): Primary | ICD-10-CM

## 2025-02-13 NOTE — PROGRESS NOTES
Colby 20w1d     Pt is requesting note to excuse her from work 2/9/25-2/25/25. She is having difficulty going to work due to her nausea causing her migraines to flare up. Is very dizzy and only feels better when she lays down. Went to ED -was seen in ED 2/7 for intractable headache, per ED note no signs of Preeclampsia. Advise to follow up with Dr. Byers.  Anatomy scan done today -results pending OB review     MATTY 7/2/2025 dated by 1st tri US (irregular periods)      #Routine prenatal care  - NIPT: neg  - Carrier screening: Carrier for CF -partner has completed salvia kit -results pending   - Partner/FOB: Dimas     #Vertigo   -has seen ENT  -has completed PT   -Neurology: Dr Garland, no further rec. Will follow up postpartum   -plans to see her orthodontist, wearing Invisalign helps      #nausea  -taking 8 mg zofran BID   -able to keep food and liquids down - has improved, still vomiting, twice a week now.      #N&V   -has tried Reglan with no relief  -is wearing an anti nausea wrist band - helps a little   -zofran Rx helping  -advised to add Vit B6 and unisom too  -taking 8 mg zofran BID   -able to keep food and liquids down - has improved, still vomiting, twice a week now.    - was seen in ED 2/7 for intractable headache, per ED note no signs of Preeclampsia. Advise to follow up with Dr. Byers.

## 2025-02-15 ENCOUNTER — TELEPHONE (OUTPATIENT)
Dept: FAMILY MEDICINE CLINIC | Facility: CLINIC | Age: 30
End: 2025-02-15

## 2025-02-15 ENCOUNTER — OFFICE VISIT (OUTPATIENT)
Dept: FAMILY MEDICINE CLINIC | Facility: CLINIC | Age: 30
End: 2025-02-15
Payer: COMMERCIAL

## 2025-02-15 VITALS
RESPIRATION RATE: 16 BRPM | HEART RATE: 62 BPM | HEIGHT: 69 IN | SYSTOLIC BLOOD PRESSURE: 100 MMHG | OXYGEN SATURATION: 99 % | TEMPERATURE: 99 F | BODY MASS INDEX: 22.07 KG/M2 | WEIGHT: 149 LBS | DIASTOLIC BLOOD PRESSURE: 60 MMHG

## 2025-02-15 DIAGNOSIS — G89.29 CHRONIC INTRACTABLE HEADACHE, UNSPECIFIED HEADACHE TYPE: ICD-10-CM

## 2025-02-15 DIAGNOSIS — R51.9 CHRONIC INTRACTABLE HEADACHE, UNSPECIFIED HEADACHE TYPE: ICD-10-CM

## 2025-02-15 DIAGNOSIS — Z3A.20 20 WEEKS GESTATION OF PREGNANCY (HCC): Primary | ICD-10-CM

## 2025-02-15 DIAGNOSIS — R81 GLYCOSURIA: ICD-10-CM

## 2025-02-15 LAB
APPEARANCE: CLEAR
BILIRUBIN: NEGATIVE
GLUCOSE (URINE DIPSTICK): 100 MG/DL
KETONES (URINE DIPSTICK): NEGATIVE MG/DL
MULTISTIX LOT#: ABNORMAL NUMERIC
NITRITE, URINE: NEGATIVE
OCCULT BLOOD: NEGATIVE
PH, URINE: 6 (ref 4.5–8)
SPECIFIC GRAVITY: 1.02 (ref 1–1.03)
TEST STRIP LOT #: ABNORMAL NUMERIC
URINE-COLOR: YELLOW
UROBILINOGEN,SEMI-QN: 0.2 MG/DL (ref 0–1.9)

## 2025-02-15 PROCEDURE — 87086 URINE CULTURE/COLONY COUNT: CPT | Performed by: PHYSICIAN ASSISTANT

## 2025-02-15 RX ORDER — CEPHALEXIN 500 MG/1
500 CAPSULE ORAL 2 TIMES DAILY
Qty: 10 CAPSULE | Refills: 0 | Status: SHIPPED | OUTPATIENT
Start: 2025-02-15 | End: 2025-02-20

## 2025-02-15 NOTE — TELEPHONE ENCOUNTER
Per Dr Bassett, patient on schedule today for headaches.  Gyne should be managing headaches to monitor for preeclampsia.  Will need to see PCP otherwise.    Called and spoke with patient who states she saw gyne and was advised to see PCP for headaches  Patient states has seen neurology and was advised they would not be able to do much for her since she is pregnant.  Patient states she scheduled with since Dr Schulte did not have any openings until mid March.  Advised patient she will need to call Dr Schulte's office to see if she can be seen sooner.  Appt with Dr Bassett cancelled.

## 2025-02-15 NOTE — PATIENT INSTRUCTIONS
Cephalexin 500 mg twice daily for 5 days.    Urine culture pending, results in 24-72 hours.  If urine culture is negative/\"no growth\", stop antibiotic.   Follow up with your PCP as scheduled, notify OBGYN that you had sugar in your urine and your random glucose (fingerstick) was 120.    Consult with neurology RE: migraine management.

## 2025-02-15 NOTE — PROGRESS NOTES
CHIEF COMPLAINT:     Chief Complaint   Patient presents with    Nausea     2 weeks, migraines nausea         HPI:     Renee Paris is a 29 year old female presents c/o intractable migraine x 2.5+ weeks.  Associated with vertigo, malaise, nausea, and unable to work due to symptoms.  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   Reports h/o migraines since 6th grade, with pregnancy has same sx but migraines have become more frequenty with pregnancy and now intractable past 2.5 weeks preventing her from working  frequent and intractable preventing her from working.  Unable to take abortive/preventive tx, per pt advised by neuro no tx available give potential complications with pregnancy.  Last neuro consult earlier in 1st trimester.     Did take single dose of excedrin with sudafed last week due to severity of symptoms which offered short term/temp relief. Aware she is not supposed to take these medications however had no alternative for relief.      Evaluated in ED on 2/7/25, no labs/imaging, exam neg for pre-eclampsia and advised f/u with OBGYN.   Seen by OBGYN x2 days ago, advised f/u with PCP RE: FMLA/prolonged work excuse.  Upcoming appt with PCP on 2/18/25 , needs work excuse note clearing her until f/u with PCP.       (+) urinary frequency.     Denies fever, no syncope, no n/v/d, no flank/abd pain, no vaginal bleeding/discharge, no hematuria.            Current Outpatient Medications   Medication Sig Dispense Refill    cephALEXin 500 MG Oral Cap Take 1 capsule (500 mg total) by mouth 2 (two) times daily for 5 days. 10 capsule 0    prenatal vitamin with DHA 27-0.8-228 MG Oral Cap Take 1 capsule by mouth daily.      Doxylamine Succinate, Sleep, (UNISOM OR) Take by mouth.      ondansetron (ZOFRAN) 4 mg tablet Take 1 tablet (4 mg total) by mouth every 8 (eight) hours as needed for Nausea. 30 tablet 0    ondansetron (ZOFRAN) 4 mg tablet Take 2 tablets (8 mg total) by mouth every 8 (eight) hours as needed for  Nausea. (Patient not taking: Reported on 2/13/2025) 30 tablet 3    metoclopramide (REGLAN) 10 MG Oral Tab Take 1 tablet (10 mg total) by mouth every 6 (six) hours as needed. (Patient not taking: Reported on 11/13/2024) 20 tablet 0      Past Medical History:    GERD (gastroesophageal reflux disease)    Migraines    Screening for genetic disease carrier status    Carrier Screen = CARRIER for Cystic Fibrosis    Seasonal allergies      Social History:  Social History     Socioeconomic History    Marital status: Single   Tobacco Use    Smoking status: Never    Smokeless tobacco: Never    Tobacco comments:     NON-SMOKER   Vaping Use    Vaping status: Never Used   Substance and Sexual Activity    Alcohol use: Not Currently     Alcohol/week: 0.0 standard drinks of alcohol    Drug use: No    Sexual activity: Yes     Partners: Male     Birth control/protection: Condom   Other Topics Concern    Caffeine Concern No     Comment: rare    Exercise No    Seat Belt Yes        REVIEW OF SYSTEMS:   GENERAL:  Denies fever, chills,weight change, decreased appetite.  SKIN: Denies rashes, skin wounds or ulcers.  EYES: Denies blurred vision or double vision  HENT: Denies facial weakness, congestion, rhinorrhea, sore throat or ear pain.  Denies diminished hearing, aural fullness, or tinnitus.  CHEST: Denies chest pain, or palpitations  LUNGS: Denies shortness of breath, cough, or wheezing  GI: Denies abdominal pain, N/V/C/D.   MUSCULOSKELETAL: no arthralgia or swollen joints  LYMPH:  Denies lymphadenopathy  NEURO:  Headaches as described above.  No seizures, no confusion, no weakness, no abnormal sensation, no vertigo.    EXAM:   /60   Pulse 62   Temp 98.8 °F (37.1 °C)   Resp 16   Ht 5' 9\" (1.753 m)   Wt 149 lb (67.6 kg)   LMP 09/15/2024   SpO2 99%   BMI 22.00 kg/m²   GENERAL: well developed, well nourished,in no apparent distress, flat affect  SKIN: no rashes or suspicious lesions  EYES: Pupils round, reactive to light and  accomodation.  EOMI.     HENT: atraumatic, normocephalic, ears and throat are clear  NECK: supple, no bruits  LUNGS: clear to auscultation bilaterally, breathing is non labored  CARDIO: RRR without murmur  NEURO: Alert & Oriented x 3.  CN II-XII intact. Moving all 4 extremities without difficulty.  No facial droop.  No slurred speech. Normal gait/station  EXTREMITIES: no cyanosis, clubbing or edema  LYMPH: No cervical or supraclavicular lymphadenopathy.   PSYCH:  Speech, mood and affect are appropriate.       Recent Results (from the past 24 hours)   ASSAY QUANTITATIVE, GLUCOSE    Collection Time: 02/15/25 11:42 AM   Result Value Ref Range    GLUCOSE BLOOD 125mg/dl     Test Strip Lot # [id number] Numeric    Test Strip Expiration Date 08-09-25 Date   URINALYSIS, AUTO, W/O SCOPE    Collection Time: 02/15/25 11:55 AM   Result Value Ref Range    Glucose Urine 100 (A) Negative mg/dL    Bilirubin Urine Negative Negative    Ketones, UA Negative Negative - Trace mg/dL    Spec Gravity 1.025 1.005 - 1.030    Blood Urine Negative Negative    PH Urine 6.0 5.0 - 8.0    Protein Urine Trace Negative - Trace mg/dL    Urobilinogen Urine 0.2 0.2 - 1.0 mg/dL    Nitrite Urine Negative Negative    Leukocyte Esterase Urine Trace (A) Negative    APPEARANCE clear Clear    Color Urine yellow Yellow    Multistix Lot# [id number] Numeric    Multistix Expiration Date 5-31-25 Date       ASSESSMENT AND PLAN:   ASSESSMENT:    ICD-10-CM    1. 20 weeks gestation of pregnancy ([facility])  Z3A.20 URINALYSIS, AUTO, W/O SCOPE     Urine Culture, Routine     Urine Culture, Routine      2. Chronic intractable headache, unspecified headache type  R51.9 URINALYSIS, AUTO, W/O SCOPE    G89.29 Urine Culture, Routine     Urine Culture, Routine      3. Glycosuria  R81 Urine Culture, Routine     Urine Culture, Routine     ASSAY QUANTITATIVE, GLUCOSE        Urine dip with glucose, LE.  Gluco-check 125 in office.   Urine culture pending.  Most recent urinalysis available in  chart from 11/18/24, cx with contamination.  P advised f/u with PCP  and OBGYN RE: glycosuria and HA.      Cephalexin 500 mg po bid x 5 days empiric coverage for UTI given LE present, advised may be 2/2 contamination however pt reports urinary frequency which may be sx related to glycosuria, pregnancy, and/or UTI.   Pt advised to discontinue if urine cx negative.   Work excuse provided through 2/18/25 given pt has upcoming appt with PCP.       Day Bronson PA-C

## 2025-02-18 ENCOUNTER — TELEPHONE (OUTPATIENT)
Dept: OBGYN CLINIC | Facility: CLINIC | Age: 30
End: 2025-02-18

## 2025-02-18 ENCOUNTER — OFFICE VISIT (OUTPATIENT)
Dept: FAMILY MEDICINE CLINIC | Facility: CLINIC | Age: 30
End: 2025-02-18
Payer: COMMERCIAL

## 2025-02-18 ENCOUNTER — PATIENT MESSAGE (OUTPATIENT)
Dept: NEUROLOGY | Facility: CLINIC | Age: 30
End: 2025-02-18

## 2025-02-18 VITALS
TEMPERATURE: 98 F | OXYGEN SATURATION: 98 % | HEIGHT: 70 IN | WEIGHT: 153 LBS | DIASTOLIC BLOOD PRESSURE: 70 MMHG | BODY MASS INDEX: 21.9 KG/M2 | SYSTOLIC BLOOD PRESSURE: 110 MMHG | HEART RATE: 84 BPM

## 2025-02-18 DIAGNOSIS — R51.9 CHRONIC INTRACTABLE HEADACHE, UNSPECIFIED HEADACHE TYPE: Primary | ICD-10-CM

## 2025-02-18 DIAGNOSIS — Z3A.20 20 WEEKS GESTATION OF PREGNANCY (HCC): ICD-10-CM

## 2025-02-18 DIAGNOSIS — G89.29 CHRONIC INTRACTABLE HEADACHE, UNSPECIFIED HEADACHE TYPE: Primary | ICD-10-CM

## 2025-02-18 PROCEDURE — 99214 OFFICE O/P EST MOD 30 MIN: CPT | Performed by: FAMILY MEDICINE

## 2025-02-18 NOTE — PATIENT INSTRUCTIONS
Patient Instructions:     Ob/gyne referral given today  Please drop off fmla paperwork to    Follow up with neurology for migraines     It was a pleasure meeting you today.  Please have labs completed as discussed in a timely manner.  Please take all medications as prescribed and discussed today.  Please have any imaging completed if ordered in time span discussed.    Please return to clinic as discussed, please return sooner as needed for any acute or worsening symptoms.    Dr. Browning

## 2025-02-18 NOTE — TELEPHONE ENCOUNTER
pt is new OB transfer - she will have med records faxed from other providers office prior to appt.

## 2025-02-18 NOTE — PROGRESS NOTES
Chief Complaint   Patient presents with    Establish Care     History of headaches/migrains, noticed during pregnancy having them a few days a week, vertigo, nausea, will take Excedrin to help, vomiting from migraines, on feet a lot for work, forms for FMLA        HPI:    Patient ID: Renee Paris is a 29 year old female.    HPI   Patient is new to me, reports past medical history significant for migraines.  Has had migraines since she was 13yo    Prior to pregnancy 1-2x migraines per week, when initially pregnant was having 7x per week for at least 4-6 weeks.   Now having 5x per week. Last several hours.   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  Patient is requesting FMLA for these conditions.  Requesting dates:   2/19/25-3/19/25  for now.   Patient does follow with OB/GYN and neurology.  Will take tylenol or at times Excedrin to take something for relief   Reports neurologist was unable to prescribe her medication 2/2 pregnancy.   Interested in establishing with different ob/gyne group.     No facial droop; no speech changes; no seizures; no LOC  No hx of HTN/HLD  Fam hx - mom with hx of brain cancer (reports now doing well)    No acute symptoms at this time.     Review of Systems        Current Outpatient Medications   Medication Sig Dispense Refill    prenatal vitamin with DHA 27-0.8-228 MG Oral Cap Take 1 capsule by mouth daily.      Doxylamine Succinate, Sleep, (UNISOM OR) Take by mouth.      ondansetron (ZOFRAN) 4 mg tablet Take 1 tablet  (4 mg total) by mouth every 8 (eight) hours as needed for Nausea. 30 tablet 0    cephALEXin 500 MG Oral Cap Take 1 capsule (500 mg total) by mouth 2 (two) times daily for 5 days. (Patient not taking: Reported on 2/18/2025) 10 capsule 0    ondansetron (ZOFRAN) 4 mg tablet Take 2 tablets (8 mg total) by mouth every 8 (eight) hours as needed for Nausea. (Patient not taking: Reported on 2/13/2025) 30 tablet 3    metoclopramide (REGLAN) 10 MG Oral Tab Take 1 tablet (10 mg total) by mouth every 6 (six) hours as needed. (Patient not taking: Reported on 11/13/2024) 20 tablet 0     Allergies:Allergies[1]    HISTORY:  Past Medical History:    GERD (gastroesophageal reflux disease)    Migraines    Screening for genetic disease carrier status    Carrier Screen = CARRIER for Cystic Fibrosis    Seasonal allergies      History reviewed. No pertinent surgical history.   Family History   Problem Relation Age of Onset    Cancer Mother 45        brain cancer    Migraines Mother     Anxiety Mother     Cancer Maternal Grandfather 70        lung      Social History:   Social History     Socioeconomic History    Marital status: Single   Tobacco Use    Smoking status: Never    Smokeless tobacco: Never    Tobacco comments:     NON-SMOKER   Vaping Use    Vaping status: Never Used   Substance and Sexual Activity    Alcohol use: Not Currently     Alcohol/week: 0.0 standard drinks of alcohol    Drug use: No    Sexual activity: Yes     Partners: Male     Birth control/protection: Condom   Other Topics Concern    Caffeine Concern No     Comment: rare    Exercise No    Seat Belt Yes        PHYSICAL EXAM:    /70   Pulse 84   Temp 98.2 °F (36.8 °C)   Ht 5' 10\" (1.778 m)   Wt 153 lb (69.4 kg)   LMP 09/15/2024   SpO2 98%   BMI 21.95 kg/m²    Physical Exam  Constitutional:       General: She is not in acute distress.     Appearance: Normal appearance. She is not ill-appearing, toxic-appearing or diaphoretic.   HENT:      Head: Atraumatic.       Mouth/Throat:      Mouth: Mucous membranes are moist.      Pharynx: No posterior oropharyngeal erythema.   Eyes:      Extraocular Movements: Extraocular movements intact.      Pupils: Pupils are equal, round, and reactive to light.   Neck:      Thyroid: No thyroid mass, thyromegaly or thyroid tenderness.   Cardiovascular:      Rate and Rhythm: Normal rate and regular rhythm.      Heart sounds: Normal heart sounds.   Pulmonary:      Effort: Pulmonary effort is normal.      Breath sounds: Normal breath sounds.   Musculoskeletal:      Cervical back: Neck supple.      Right lower leg: No edema.      Left lower leg: No edema.   Skin:     General: Skin is warm and dry.      Coloration: Skin is not jaundiced.      Findings: No rash.   Neurological:      General: No focal deficit present.      Mental Status: She is alert and oriented to person, place, and time.      Cranial Nerves: No cranial nerve deficit.      Motor: No weakness.      Gait: Gait normal.   Psychiatric:         Mood and Affect: Mood and affect normal.              ASSESSMENT/PLAN:     Assessment & Plan  20 weeks gestation of pregnancy ([facility])  Pt interested in establishing care with another ob/gyne - given referral today.   Orders:    OBG - INTERNAL    Chronic intractable headache, unspecified headache type  Normal neuro exam at this time, VSS  Advised pt to follow up with neurology to discuss management options. To continue anti nausea therapy per ob/gyne.   Red flag symptoms reviewed.   Letter for work given, will initiate FMLA paperwork (for 1mo) to follow (pt will drop off for forms department). For extension advised to see specialist.          Follow up with pcp in 1mo   (Advised f/u with ob/gyne & neurology within the next couple of weeks)    Instructions provided as documented in the AVS.    The patient indicated understanding of the diagnosis and agreed with the plan of care.    Red flag s/s reviewed and discussed for conditions listed including  concerns for prompt ED evaluation  Medical compliance with plan discussed and risks of non-compliance reviewed  Education completed on disease process, etiology & prognosis  Proper usage and side effects of medications reviewed & discussed    Return to clinic as clinically indicated or as discussed         [1] No Known Allergies

## 2025-02-19 ENCOUNTER — TELEPHONE (OUTPATIENT)
Facility: CLINIC | Age: 30
End: 2025-02-19

## 2025-02-19 NOTE — TELEPHONE ENCOUNTER
[address] [address]  94 Carrier Screen results = Negative for 3 out of 3 diseases (CARNITINE DEFICIENCY, CARNITINE DEFICIENCY, GALACTOSEMIA). Patient is a carrier for cystic fibrosis.

## 2025-02-20 ENCOUNTER — PATIENT MESSAGE (OUTPATIENT)
Dept: FAMILY MEDICINE CLINIC | Facility: CLINIC | Age: 30
End: 2025-02-20

## 2025-02-20 NOTE — TELEPHONE ENCOUNTER
[facility] order placed incorrectly. Faxed document to [employer] to update panel to [id number].    Awaiting for updated results in [employer].

## 2025-02-20 NOTE — TELEPHONE ENCOUNTER
Discussed with provider.  Provider can provide FMLA for migraines for 2 days per month up to 12 hours per day.  Will not sign for short term disabaility

## 2025-02-21 NOTE — TELEPHONE ENCOUNTER
I tried calling Susy back regarding her discussion and disability and FMLA.  It looks like she has an appointment with a different neurologist coming up.  I certainly would not want to fill out FMLA forms for disability forms if she is no longer going to be under my supervision and care for migraines.  The phone went immediately to [email or web address].  I can send her a [employer] message as well.  Again typically we would allow 1 to 2 days off per month.  I am currently not prescribing her any medications for migraines and I am not quite certain if she is looking for disability throughout her entire pregnancy or something different.  She can discuss this with her new neurologist if she would like.

## 2025-02-25 ENCOUNTER — TELEPHONE (OUTPATIENT)
Dept: NEUROLOGY | Facility: CLINIC | Age: 30
End: 2025-02-25

## 2025-02-25 PROBLEM — Z14.1 CYSTIC FIBROSIS CARRIER: Status: ACTIVE | Noted: 2025-02-25

## 2025-02-25 PROBLEM — Z34.90 PREGNANCY (HCC): Status: ACTIVE | Noted: 2025-02-25

## 2025-02-25 NOTE — TELEPHONE ENCOUNTER
I called Renee back regarding her migraines. She states that she still getting fairly frequent migraines.  States that she needs disability forms and not FMLA as she needs paid for her time off work.  I did discuss that I was willing to give her 1 to 2 days/month.  She states that that is not what she needs but she is already taken off from 19 February through 19 March and would like short-term disability for those dates.  I did discuss that I am not actually treating her with any medications for migraine at this time and that my options are limited.  I did tell her that I spoke directly to her other neurologist that she was going to see Dr. Golden and he is of the same opinion is myself that there may be no medications that he can help her at this time, nor would he give her short-term disability for her pregnancy.  She states that she only wanted 1 month of disability up until March 19.  I did ask her what would happen if she had more migraines after 19 March and she states that she is not certain, possibly she would need more short-term disability.  I also asked her what would happen if her migraines persisted throughout her pregnancy and continued into the post partum period where she was breast-feeding but may not be able to take medications.  She states that she did not know what she would do at that time either but could not work if she had migraines and was not taking her medications so may need disability. I did tell her that I was very sorry that I was unable to provide her with short-term disability for migraine headaches for the entirety of her pregnancy and potentially into her breast-feeding period.  At this time I am not certain what else I can do to help her with her migraines that she has been fairly refractory to most medications tried except for venlafaxine which she had previously been on but is not taking due to her pregnancy at this time.  She states that she is in the process of looking for  other doctors to discuss her situation with.

## 2025-02-26 NOTE — TELEPHONE ENCOUNTER
Placed short term disability on hold, patient has 2/27 appointment with OB to discuss short term disability. Sent [employer] message to reach out to forms department once patient knows if provider will sign off on paperwork.

## 2025-02-26 NOTE — TELEPHONE ENCOUNTER
LVM for patient to call and schedule appt with Dr. Gomez for this Thursday per YZ. Also sent MCM to patient for same.

## 2025-02-26 NOTE — TELEPHONE ENCOUNTER
Patient called to advise that  will sign off the form. Obtained details, patient states she is in the process of completing an Release of Information via [employer]. Advised patient provider will be tasked to see if she supports. Notified  DC.     Type of Leave: Continuous Disab  Reason for Leave: headache, nausea   Start date of leave: 02/19/25  End date of leave: 03/19/25

## 2025-02-26 NOTE — TELEPHONE ENCOUNTER
Appt has been scheduled for pt    Future Appointments   Date Time Provider Department Center   2/27/2025 11:30 AM Jan Gomez MD [facility] [place]   3/11/2025  2:30 PM India Jeffers DO [facility] [place]   3/14/2025  1:30 PM Giovani Whitlock MD [facility] [place]   3/18/2025  4:40 PM Tari Browning DO [facility] [place]

## 2025-02-26 NOTE — TELEPHONE ENCOUNTER
Harshal Gomez,      Patient requesting continuous Family Medical Leave Act due to headaches and nausea; patient is currently pregnant.   Patient is scheduled to see you on 02/27/25.    Start date: 02/19/25  End date: 03/19/25    Do you support the request?    Thank you so much,   Sara

## 2025-02-27 ENCOUNTER — INITIAL PRENATAL (OUTPATIENT)
Dept: OBGYN CLINIC | Facility: CLINIC | Age: 30
End: 2025-02-27
Payer: COMMERCIAL

## 2025-02-27 VITALS
HEART RATE: 85 BPM | BODY MASS INDEX: 22 KG/M2 | SYSTOLIC BLOOD PRESSURE: 100 MMHG | WEIGHT: 156.19 LBS | DIASTOLIC BLOOD PRESSURE: 60 MMHG

## 2025-02-27 DIAGNOSIS — Z87.59 HX OF MIGRAINE DURING PREGNANCY: Primary | ICD-10-CM

## 2025-02-27 DIAGNOSIS — Z86.69 HX OF MIGRAINE DURING PREGNANCY: Primary | ICD-10-CM

## 2025-02-27 DIAGNOSIS — Z3A.22 22 WEEKS GESTATION OF PREGNANCY (HCC): ICD-10-CM

## 2025-02-27 PROBLEM — F41.9 ANXIETY: Status: RESOLVED | Noted: 2017-10-17 | Resolved: 2025-02-27

## 2025-02-27 NOTE — PROGRESS NOTES
NEW OB TRANSFER    Subjective:  Chief Complaint   Patient presents with    Prenatal Care     New OB      29 year old  female    presents for new OB visit    Patient's last menstrual period was 09/15/2024. Estimated Date of Delivery: 25   Patient presents as transfer of care from our other [facility] at 22w1d weeks of pregnancy.    Has severe migraine headaches 5-6x a week significantly affecting her ability to work. She has been unable to work since January. Migraines are one sided, severe, lasting 6 hours to 1.5 days with sensitivity to light and sound. Has lost her peripheral vision. Neurology would like OB/MFM input prior to starting treatment. She has had success with venlafaxine and sumatriptan in the past. Currently she has been using excedrin, but it is not very effective and knows she should not use it in pregnancy.     Is otherwise taking zofran for n/v.     OB History    Para Term  AB Living   1 0 0 0 0 0   SAB IAB Ectopic Multiple Live Births   0 0 0 0 0      # Outcome Date GA Lbr Georges/2nd Weight Sex Type Anes PTL Lv   1 Current                Past Medical History:    GERD (gastroesophageal reflux disease)    Migraines    Screening for genetic disease carrier status    Carrier Screen = CARRIER for Cystic Fibrosis    Seasonal allergies     History reviewed. No pertinent surgical history.    Medications:  Medications Ordered Prior to Encounter[1]    Allergies:  Allergies[2]    Social History:  Social History     Tobacco Use    Smoking status: Never    Smokeless tobacco: Never    Tobacco comments:     NON-SMOKER   Substance Use Topics    Alcohol use: Not Currently     Alcohol/week: 0.0 standard drinks of alcohol       Family History:  Family History   Problem Relation Age of Onset    Cancer Mother 45        brain cancer    Migraines Mother     Anxiety Mother     Cancer Maternal Grandfather 70        lung       Review of Systems:  Pertinent items are noted in HPI.    Objective:  BP  100/60   Pulse 85   Wt 156 lb 3.2 oz (70.9 kg)   LMP 09/15/2024   BMI 22.41 kg/m²    Physical Examination:  General appearance: Well dressed, well nourished in no apparent distress    FH 21      Assessment/Plan:  29 year old  EGA 22w1d MATTY Estimated Date of Delivery: 25  Addressed patients concerns regarding pregnancy.      Patient Active Problem List    Diagnosis    Cystic fibrosis carrier     FOB is not carrier       Pregnancy (HCC)     Xfer from [facility] 2 at 22 wks.      Vertigo    Imbalance    Migraine without aura, not intractable, without status migrainosus     Followed by neurology. Short term disability requested for appx 1 month until we can get her on an effective regimen. Discussed nightly magnesium. Recommended against excedrin. Discussed Tylenol+caffeine pill combo.   [ ] MFM consult ordered         Jan Gomez MD         [1]   Current Outpatient Medications on File Prior to Visit   Medication Sig Dispense Refill    prenatal vitamin with DHA 27-0.8-228 MG Oral Cap Take 1 capsule by mouth daily.      Doxylamine Succinate, Sleep, (UNISOM OR) Take by mouth.      ondansetron (ZOFRAN) 4 mg tablet Take 1 tablet (4 mg total) by mouth every 8 (eight) hours as needed for Nausea. 30 tablet 0    ondansetron (ZOFRAN) 4 mg tablet Take 2 tablets (8 mg total) by mouth every 8 (eight) hours as needed for Nausea. (Patient not taking: Reported on 2025) 30 tablet 3    metoclopramide (REGLAN) 10 MG Oral Tab Take 1 tablet (10 mg total) by mouth every 6 (six) hours as needed. (Patient not taking: Reported on 2025) 20 tablet 0     No current facility-administered medications on file prior to visit.   [2] No Known Allergies

## 2025-02-27 NOTE — PATIENT INSTRUCTIONS
You have been referred to Maternal Fetal Medicine (High Risk OB/Perinatology) for an obstetrical consultation.      Dr. Harpreet Boland    You will need to call to make an appointment.    [address]  [address] [phone]

## 2025-02-28 NOTE — TELEPHONE ENCOUNTER
Sent another [employer] message asking if the new disability form is the same exact form as her December disability, we still have the blank on file, informed her we could use the one from December if it's the same one. Asked patient to please call to let us know.

## 2025-02-28 NOTE — TELEPHONE ENCOUNTER
Patient called stating she resubmitted disability form via [employer].  Form located, rep informed.

## 2025-02-28 NOTE — TELEPHONE ENCOUNTER
MD approved task, form was sent in via email, file is corrupted, unable to open form. Called and left voicemail to patient to inform her and ask to please resubmit form, also sent [employer] message.

## 2025-02-28 NOTE — TELEPHONE ENCOUNTER
Dr. Gomez,    **The ACKNOWLEDGE button has been moved to the top right ribbon**    Please sign off on form if you agree to: disability for migraine from 2/19/25 to 3/19/25  (place your signature on the first page only)  -From your Inbasket, Highlight the patient and click Chart  -Double click the 2/11/25 Forms Completion telephone encounter  -Scroll down to the Media section  -Click the blue Hyperlink: Maninder Gomez 2/28/25    -Click and HOLD Acknowledge located in the top right ribbon/menu  -Drag the mouse into the blank space of the document and a + sign will appear. Left click to  electronically sign the document.    Thank you,    Erika

## 2025-03-13 ENCOUNTER — TELEPHONE (OUTPATIENT)
Dept: OBGYN CLINIC | Facility: CLINIC | Age: 30
End: 2025-03-13

## 2025-06-04 LAB
HIV RESULT OB: NEGATIVE
STREP GP B CULT OB: NEGATIVE

## 2025-06-18 ENCOUNTER — HOSPITAL ENCOUNTER (INPATIENT)
Facility: HOSPITAL | Age: 30
LOS: 2 days | Discharge: HOME | End: 2025-06-20
Attending: OBSTETRICS & GYNECOLOGY | Admitting: OBSTETRICS & GYNECOLOGY
Payer: COMMERCIAL

## 2025-06-18 ENCOUNTER — ANESTHESIA EVENT (OUTPATIENT)
Dept: OBGYN UNIT | Facility: HOSPITAL | Age: 30
End: 2025-06-18
Payer: COMMERCIAL

## 2025-06-18 ENCOUNTER — ANESTHESIA (OUTPATIENT)
Dept: OBGYN UNIT | Facility: HOSPITAL | Age: 30
End: 2025-06-18
Payer: COMMERCIAL

## 2025-06-18 LAB
ANTIBODY SCREEN: NEGATIVE
BASOPHILS # BLD AUTO: 0.03 X10(3) UL (ref 0–0.2)
BASOPHILS NFR BLD AUTO: 0.3 %
EOSINOPHIL # BLD AUTO: 0.05 X10(3) UL (ref 0–0.7)
EOSINOPHIL NFR BLD AUTO: 0.5 %
ERYTHROCYTE [DISTWIDTH] IN BLOOD BY AUTOMATED COUNT: 13.5 %
HCT VFR BLD AUTO: 33.6 % (ref 35–48)
HGB BLD-MCNC: 11 G/DL (ref 12–16)
IMM GRANULOCYTES # BLD AUTO: 0.05 X10(3) UL (ref 0–1)
IMM GRANULOCYTES NFR BLD: 0.5 %
LYMPHOCYTES # BLD AUTO: 1.91 X10(3) UL (ref 1–4)
LYMPHOCYTES NFR BLD AUTO: 18.9 %
MCH RBC QN AUTO: 27.2 PG (ref 26–34)
MCHC RBC AUTO-ENTMCNC: 32.7 G/DL (ref 31–37)
MCV RBC AUTO: 83.2 FL (ref 80–100)
MONOCYTES # BLD AUTO: 0.81 X10(3) UL (ref 0.1–1)
MONOCYTES NFR BLD AUTO: 8 %
NEUTROPHILS # BLD AUTO: 7.28 X10 (3) UL (ref 1.5–7.7)
NEUTROPHILS # BLD AUTO: 7.28 X10(3) UL (ref 1.5–7.7)
NEUTROPHILS NFR BLD AUTO: 71.8 %
PLATELET # BLD AUTO: 231 10(3)UL (ref 150–450)
RBC # BLD AUTO: 4.04 X10(6)UL (ref 3.8–5.3)
RH BLOOD TYPE: POSITIVE
T PALLIDUM AB SER QL IA: NONREACTIVE
WBC # BLD AUTO: 10.1 X10(3) UL (ref 4–11)

## 2025-06-18 PROCEDURE — 86900 BLOOD TYPING SEROLOGIC ABO: CPT | Performed by: OBSTETRICS & GYNECOLOGY

## 2025-06-18 PROCEDURE — 86901 BLOOD TYPING SEROLOGIC RH(D): CPT | Performed by: OBSTETRICS & GYNECOLOGY

## 2025-06-18 PROCEDURE — 85025 COMPLETE CBC W/AUTO DIFF WBC: CPT | Performed by: OBSTETRICS & GYNECOLOGY

## 2025-06-18 PROCEDURE — 99214 OFFICE O/P EST MOD 30 MIN: CPT

## 2025-06-18 PROCEDURE — 86850 RBC ANTIBODY SCREEN: CPT | Performed by: OBSTETRICS & GYNECOLOGY

## 2025-06-18 PROCEDURE — 86780 TREPONEMA PALLIDUM: CPT | Performed by: OBSTETRICS & GYNECOLOGY

## 2025-06-18 RX ORDER — BUPIVACAINE HYDROCHLORIDE 2.5 MG/ML
30 INJECTION, SOLUTION EPIDURAL; INFILTRATION; INTRACAUDAL; PERINEURAL AS NEEDED
Status: DISCONTINUED | OUTPATIENT
Start: 2025-06-18 | End: 2025-06-20

## 2025-06-18 RX ORDER — ONDANSETRON 2 MG/ML
4 INJECTION INTRAMUSCULAR; INTRAVENOUS EVERY 6 HOURS PRN
Status: DISCONTINUED | OUTPATIENT
Start: 2025-06-18 | End: 2025-06-19 | Stop reason: HOSPADM

## 2025-06-18 RX ORDER — CALCIUM CARBONATE 500 MG/1
2 TABLET, CHEWABLE ORAL AS NEEDED
COMMUNITY

## 2025-06-18 RX ORDER — DEXTROSE, SODIUM CHLORIDE, SODIUM LACTATE, POTASSIUM CHLORIDE, AND CALCIUM CHLORIDE 5; .6; .31; .03; .02 G/100ML; G/100ML; G/100ML; G/100ML; G/100ML
INJECTION, SOLUTION INTRAVENOUS AS NEEDED
Status: DISCONTINUED | OUTPATIENT
Start: 2025-06-18 | End: 2025-06-19 | Stop reason: HOSPADM

## 2025-06-18 RX ORDER — BUPIVACAINE HCL/0.9 % NACL/PF 0.25 %
PLASTIC BAG, INJECTION (ML) EPIDURAL
Status: DISCONTINUED
Start: 2025-06-18 | End: 2025-06-19 | Stop reason: WASHOUT

## 2025-06-18 RX ORDER — MAGNESIUM OXIDE 400 MG/1
400 TABLET ORAL DAILY
COMMUNITY

## 2025-06-18 RX ORDER — LIDOCAINE HYDROCHLORIDE AND EPINEPHRINE 15; 5 MG/ML; UG/ML
5 INJECTION, SOLUTION EPIDURAL AS NEEDED
Status: DISCONTINUED | OUTPATIENT
Start: 2025-06-18 | End: 2025-06-20

## 2025-06-18 RX ORDER — SODIUM CHLORIDE 9 MG/ML
INJECTION, SOLUTION INTRAMUSCULAR; INTRAVENOUS; SUBCUTANEOUS
Status: DISCONTINUED
Start: 2025-06-18 | End: 2025-06-19 | Stop reason: WASHOUT

## 2025-06-18 RX ORDER — ROPIVACAINE HYDROCHLORIDE 5 MG/ML
30 INJECTION, SOLUTION EPIDURAL; INFILTRATION; PERINEURAL AS NEEDED
Status: DISCONTINUED | OUTPATIENT
Start: 2025-06-18 | End: 2025-06-19 | Stop reason: HOSPADM

## 2025-06-18 RX ORDER — ACETAMINOPHEN 500 MG
1000 TABLET ORAL EVERY 6 HOURS PRN
Status: DISCONTINUED | OUTPATIENT
Start: 2025-06-18 | End: 2025-06-19 | Stop reason: HOSPADM

## 2025-06-18 RX ORDER — ACETAMINOPHEN 500 MG
500 TABLET ORAL EVERY 6 HOURS PRN
Status: DISCONTINUED | OUTPATIENT
Start: 2025-06-18 | End: 2025-06-19 | Stop reason: HOSPADM

## 2025-06-18 RX ORDER — SODIUM CHLORIDE, SODIUM LACTATE, POTASSIUM CHLORIDE, CALCIUM CHLORIDE 600; 310; 30; 20 MG/100ML; MG/100ML; MG/100ML; MG/100ML
INJECTION, SOLUTION INTRAVENOUS CONTINUOUS
Status: DISCONTINUED | OUTPATIENT
Start: 2025-06-18 | End: 2025-06-19 | Stop reason: HOSPADM

## 2025-06-18 RX ORDER — SODIUM CHLORIDE 9 MG/ML
10 INJECTION, SOLUTION INTRAMUSCULAR; INTRAVENOUS; SUBCUTANEOUS AS NEEDED
Status: DISCONTINUED | OUTPATIENT
Start: 2025-06-18 | End: 2025-06-20

## 2025-06-18 RX ORDER — NALBUPHINE HYDROCHLORIDE 10 MG/ML
2.5 INJECTION INTRAMUSCULAR; INTRAVENOUS; SUBCUTANEOUS
Status: DISCONTINUED | OUTPATIENT
Start: 2025-06-18 | End: 2025-06-20

## 2025-06-18 RX ORDER — CITRIC ACID/SODIUM CITRATE 334-500MG
30 SOLUTION, ORAL ORAL AS NEEDED
Status: DISCONTINUED | OUTPATIENT
Start: 2025-06-18 | End: 2025-06-19 | Stop reason: HOSPADM

## 2025-06-18 RX ORDER — TERBUTALINE SULFATE 1 MG/ML
0.25 INJECTION SUBCUTANEOUS AS NEEDED
Status: DISCONTINUED | OUTPATIENT
Start: 2025-06-18 | End: 2025-06-19 | Stop reason: HOSPADM

## 2025-06-18 RX ORDER — IBUPROFEN 600 MG/1
600 TABLET, FILM COATED ORAL ONCE AS NEEDED
Status: COMPLETED | OUTPATIENT
Start: 2025-06-18 | End: 2025-06-19

## 2025-06-18 RX ORDER — LIDOCAINE HYDROCHLORIDE AND EPINEPHRINE 15; 5 MG/ML; UG/ML
INJECTION, SOLUTION EPIDURAL AS NEEDED
Status: DISCONTINUED | OUTPATIENT
Start: 2025-06-18 | End: 2025-06-19 | Stop reason: SURG

## 2025-06-18 RX ORDER — BUPIVACAINE HCL/0.9 % NACL/PF 0.25 %
5 PLASTIC BAG, INJECTION (ML) EPIDURAL AS NEEDED
Status: DISCONTINUED | OUTPATIENT
Start: 2025-06-18 | End: 2025-06-20

## 2025-06-18 RX ORDER — LIDOCAINE HYDROCHLORIDE 20 MG/ML
5 INJECTION, SOLUTION EPIDURAL; INFILTRATION; INTRACAUDAL; PERINEURAL AS NEEDED
Status: DISCONTINUED | OUTPATIENT
Start: 2025-06-18 | End: 2025-06-20

## 2025-06-18 RX ADMIN — LIDOCAINE HYDROCHLORIDE AND EPINEPHRINE 3 ML: 15; 5 INJECTION, SOLUTION EPIDURAL at 21:28:00

## 2025-06-19 PROCEDURE — 0HQ9XZZ REPAIR PERINEUM SKIN, EXTERNAL APPROACH: ICD-10-PCS | Performed by: OBSTETRICS & GYNECOLOGY

## 2025-06-19 RX ORDER — ACETAMINOPHEN 500 MG
500 TABLET ORAL EVERY 6 HOURS PRN
Status: DISCONTINUED | OUTPATIENT
Start: 2025-06-19 | End: 2025-06-20

## 2025-06-19 RX ORDER — BISACODYL 10 MG
10 SUPPOSITORY, RECTAL RECTAL ONCE AS NEEDED
Status: DISCONTINUED | OUTPATIENT
Start: 2025-06-19 | End: 2025-06-20

## 2025-06-19 RX ORDER — HYDROCODONE BITARTRATE AND ACETAMINOPHEN 5; 325 MG/1; MG/1
1 TABLET ORAL EVERY 6 HOURS PRN
Refills: 0 | Status: DISCONTINUED | OUTPATIENT
Start: 2025-06-19 | End: 2025-06-20

## 2025-06-19 RX ORDER — AMMONIA 15 % (W/V)
0.3 AMPUL (EA) INHALATION AS NEEDED
Status: DISCONTINUED | OUTPATIENT
Start: 2025-06-19 | End: 2025-06-20

## 2025-06-19 RX ORDER — SIMETHICONE 80 MG
80 TABLET,CHEWABLE ORAL 3 TIMES DAILY PRN
Status: DISCONTINUED | OUTPATIENT
Start: 2025-06-19 | End: 2025-06-20

## 2025-06-19 RX ORDER — IBUPROFEN 600 MG/1
600 TABLET, FILM COATED ORAL EVERY 6 HOURS
Status: DISCONTINUED | OUTPATIENT
Start: 2025-06-19 | End: 2025-06-20

## 2025-06-19 RX ORDER — CALCIUM CARBONATE 500 MG/1
1000 TABLET, CHEWABLE ORAL 3 TIMES DAILY PRN
Status: DISCONTINUED | OUTPATIENT
Start: 2025-06-19 | End: 2025-06-20

## 2025-06-19 RX ORDER — ACETAMINOPHEN 500 MG
1000 TABLET ORAL EVERY 6 HOURS PRN
Status: DISCONTINUED | OUTPATIENT
Start: 2025-06-19 | End: 2025-06-20

## 2025-06-19 RX ORDER — DOCUSATE SODIUM 100 MG/1
100 CAPSULE, LIQUID FILLED ORAL
Status: DISCONTINUED | OUTPATIENT
Start: 2025-06-19 | End: 2025-06-20

## 2025-06-19 NOTE — PLAN OF CARE
Pt admitted to [facility] Baby unit room 1116 via, accompanied by significant other. Report received from KALI Galo. Initial assessment and vitals completed. Pt introduced to room and plan of care.        Problem: SAFETY ADULT - FALL  Goal: Free from fall injury  Description: INTERVENTIONS:  - Assess pt frequently for physical needs  - Identify cognitive and physical deficits and behaviors that affect risk of falls.  - [place] fall precautions as indicated by assessment.  - Educate pt/family on patient safety including physical limitations  - Instruct pt to call for assistance with activity based on assessment  - Modify environment to reduce risk of injury  - Provide assistive devices as appropriate  - Consider OT/PT consult to assist with strengthening/mobility  - Encourage toileting schedule  Outcome: Progressing     Problem: POSTPARTUM  Goal: Long Term Goal:Experiences normal postpartum course  Description: INTERVENTIONS:  - Assess and monitor vital signs and lab values.  - Assess fundus and lochia.  - Provide ice/sitz baths for perineum discomfort.  - Monitor healing of incision/episiotomy/laceration, and assess for signs and symptoms of infection and hematoma.  - Assess bladder function and monitor for bladder distention.  - Provide/instruct/assist with pericare as needed.  - Provide VTE prophylaxis as needed.  - Monitor bowel function.  - Encourage ambulation and provide assistance as needed.  - Assess and monitor emotional status and provide social service/psych resources as needed.  - Utilize standard precautions and use personal protective equipment as indicated. Ensure aseptic care of all intravenous lines and invasive tubes/drains.  - Obtain immunization and exposure to communicable diseases history.  Outcome: Progressing  Goal: Optimize infant feeding at the breast  Description: INTERVENTIONS:  - Initiate breast feeding within first hour after birth.   - Monitor effectiveness of current breast feeding  efforts.  - Assess support systems available to mother/family.  - Identify cultural beliefs/practices regarding lactation, letdown techniques, maternal food preferences.  - Assess mother's knowledge and previous experience with breast feeding.  - Provide information as needed about early infant feeding cues (e.g., rooting, lip smacking, sucking fingers/hand) versus late cue of crying.  - Discuss/demonstrate breast feeding aids (e.g., infant sling, nursing footstool/pillows, and breast pumps).  - Encourage mother/other family members to express feelings/concerns, and actively listen.  - Educate father/SO about benefits of breast feeding and how to manage common lactation challenges.  - Recommend avoidance of specific medications or substances incompatible with breast feeding.  - Assess and monitor for signs of nipple pain/trauma.  - Instruct and provide assistance with proper latch.  - Review techniques for milk expression (breast pumping) and storage of breast milk. Provide pumping equipment/supplies, instructions and assistance, as needed.  - Encourage rooming-in and breast feeding on demand.  - Encourage skin-to-skin contact.  - Provide LC support as needed.  - Assess for and manage engorgement.  - Provide breast feeding education handouts and information on community breast feeding support.   Outcome: Progressing  Goal: Establishment of adequate milk supply with medication/procedure interruptions  Description: INTERVENTIONS:  - Review techniques for milk expression (breast pumping).   - Provide pumping equipment/supplies, instructions, and assistance until it is safe to breastfeed infant.  Outcome: Progressing  Goal: Appropriate maternal -  bonding  Description: INTERVENTIONS:  - Assess caregiver- interactions.  - Assess caregiver's emotional status and coping mechanisms.  - Encourage caregiver to participate in  daily care.  - Assess support systems available to mother/family.  - Provide  /case management support as needed.  Outcome: Progressing

## 2025-06-19 NOTE — ANESTHESIA PREPROCEDURE EVALUATION
PRE-OP EVALUATION    Patient Name: Renee Paris    Admit Diagnosis: pregnancy  Pregnancy (HCC)    Pre-op Diagnosis: * No pre-op diagnosis entered *        Anesthesia Procedure: LABOR ANALGESIA    * No surgeons found in log *    Pre-op vitals reviewed.  Pulse: 89  BP: 124/64  SpO2: 98 %  Body mass index is 24.11 kg/m².    Current medications reviewed.  Hospital Medications:  Current Medications[1]    Outpatient Medications:   Prescriptions Prior to Admission[2]    Allergies: Seasonal      Anesthesia Evaluation    Patient summary reviewed.    Anesthetic Complications  (-) history of anesthetic complications         GI/Hepatic/Renal      (+) GERD                           Cardiovascular    Negative cardiovascular ROS.    Exercise tolerance: good     MET: >4                                           Endo/Other    Negative endo/other ROS.                              Pulmonary    Negative pulmonary ROS.                       Neuro/Psych                   (+) headaches           Patient Active Problem List:     Migraine without aura, not intractable, without status migrainosus     Imbalance     Vertigo     Cystic fibrosis carrier     Pregnancy (HCC)            Past Surgical History[3]  Social Hx on file[4]  History   Drug Use No     Available pre-op labs reviewed.  Lab Results   Component Value Date    WBC 10.1 06/18/2025    RBC 4.04 06/18/2025    HGB 11.0 (L) 06/18/2025    HCT 33.6 (L) 06/18/2025    MCV 83.2 06/18/2025    MCH 27.2 06/18/2025    MCHC 32.7 06/18/2025    RDW 13.5 06/18/2025    .0 06/18/2025               Airway      Mallampati: II  Mouth opening: 3 FB  TM distance: 4 - 6 cm  Neck ROM: full Cardiovascular    Cardiovascular exam normal.         Dental    Dentition appears grossly intact         Pulmonary    Pulmonary exam normal.                 Other findings              ASA: 2   Plan: epidural  NPO status verified and patient meets guidelines.    Post-procedure pain management plan discussed  with surgeon and patient.  Surgeon requests: regional block    Plan/risks discussed with: patient                Present on Admission:  **None**             [1]    lactated ringers infusion   Intravenous Continuous    dextrose in lactated ringers 5% infusion   Intravenous PRN    lactated ringers IV bolus 500 mL  500 mL Intravenous PRN    acetaminophen (Tylenol Extra Strength) tab 500 mg  500 mg Oral Q6H PRN    acetaminophen (Tylenol Extra Strength) tab 1,000 mg  1,000 mg Oral Q6H PRN    ibuprofen (Motrin) tab 600 mg  600 mg Oral Once PRN    ondansetron (Zofran) 4 MG/2ML injection 4 mg  4 mg Intravenous Q6H PRN    oxyTOCIN in sodium chloride 0.9% (Pitocin) 30 Units/500mL infusion premix  62.5-900 evon-units/min Intravenous Continuous    terbutaline (Brethine) 1 MG/ML injection 0.25 mg  0.25 mg Subcutaneous PRN    sodium citrate-citric acid (Bicitra) 500-334 MG/5ML oral solution 30 mL  30 mL Oral PRN    ropivacaine (Naropin) 0.5% injection  30 mL Injection PRN    oxyTOCIN in sodium chloride 0.9% (Pitocin) 30 Units/500mL infusion premix  0.5-20 evon-units/min Intravenous Continuous    [COMPLETED] lactated ringers IV bolus 1,000 mL  1,000 mL Intravenous Once    fentaNYL-bupivacaine 2 mcg/mL-0.125% in sodium chloride 0.9% 100 mL EPIDURAL infusion premix  12 mL/hr Epidural Continuous    [COMPLETED] fentaNYL (Sublimaze) 50 mcg/mL injection 100 mcg  100 mcg Epidural Once    lidocaine 1.5%-EPINEPHrine 1:200,000 (Xylocaine-Epinephrine) injection  5 mL Injection PRN    bupivacaine PF (Marcaine) 0.25% injection  30 mL Injection PRN    lidocaine PF (Xylocaine-MPF) 2% injection  5 mL Injection PRN    sodium chloride 0.9% PF injection 10 mL  10 mL Injection PRN    ePHEDrine (PF) 25 MG/5 ML injection 5 mg  5 mg Intravenous PRN    nalbuphine (Nubain) 10 mg/mL injection 2.5 mg  2.5 mg Intravenous Q15 Min PRN    sodium chloride 0.9% PF 0.9% injection        fentaNYL-bupivacaine in sodium chloride 0.9% 2 mcg/mL-0.125% EPIDURAL  infusion premix        fentaNYL (Sublimaze) 50 mcg/mL injection        ePHEDrine (PF) 25 MG/5 ML injection        lidocaine 1.5%-EPINEPHrine 1:200,000 (Xylocaine-Epinephrine) injection   Epidural PRN   [2]   Medications Prior to Admission   Medication Sig Dispense Refill Last Dose/Taking    Prenatal MV & Min w/FA-DHA (PRENATAL GUMMIES OR) Take 2 tablets by mouth at bedtime.   6/17/2025 at 11:00 PM    magnesium oxide 400 MG Oral Tab Take 1 tablet (400 mg total) by mouth daily.   6/17/2025 at 11:00 PM    calcium carbonate 500 MG Oral Chew Tab Chew 2 tablets (1,000 mg total) by mouth as needed for Heartburn. 3 to 4 times daily   6/18/2025 at  4:00 PM    Doxylamine Succinate, Sleep, (UNISOM OR) Take 0.5 tablets by mouth daily.   6/17/2025 at 11:00 PM    ondansetron (ZOFRAN) 4 mg tablet Take 1 tablet (4 mg total) by mouth every 8 (eight) hours as needed for Nausea. (Patient taking differently: Take 2 tablets (8 mg total) by mouth daily.) 30 tablet 0 6/18/2025 at  9:00 AM    prenatal vitamin with DHA 27-0.8-228 MG Oral Cap Take 1 capsule by mouth in the morning.       ondansetron (ZOFRAN) 4 mg tablet Take 2 tablets (8 mg total) by mouth every 8 (eight) hours as needed for Nausea. (Patient not taking: Reported on 2/27/2025) 30 tablet 3     metoclopramide (REGLAN) 10 MG Oral Tab Take 1 tablet (10 mg total) by mouth every 6 (six) hours as needed. (Patient not taking: Reported on 2/27/2025) 20 tablet 0    [3] History reviewed. No pertinent surgical history.  [4]   Social History  Socioeconomic History    Marital status: Single   Tobacco Use    Smoking status: Never     Passive exposure: Never    Smokeless tobacco: Never    Tobacco comments:     NON-SMOKER   Vaping Use    Vaping status: Never Used   Substance and Sexual Activity    Alcohol use: Not Currently     Alcohol/week: 0.0 standard drinks of alcohol    Drug use: No    Sexual activity: Yes     Partners: Male     Birth control/protection: Condom   Other Topics Concern     Caffeine Concern No     Comment: rare    Exercise No    Seat Belt Yes

## 2025-06-19 NOTE — L&D DELIVERY NOTE
Diego Paris [[id number]]      Labor Events     labor?: No   steroids?: None  Antibiotics received during labor?: No  Rupture date/time: 2025 165     Rupture type: SROM  Fluid color: Clear  Labor type: Spontaneous Onset of Labor  Augmentation: Oxytocin  Indications for augmentation: Ineffective Contraction Pattern  Intrapartum & labor complications: None       Labor Length    1st stage: 3h 58m  2nd stage: 2h 56m  3rd stage: 0h 03m       Labor Event Times    Labor onset date/time: 2025  Dilation complete date/time: 2025 0148  Start pushing date/time: 2025 01:52       [place] Presentation    Presentation: Vertex  Position: Left Occiput Anterior       Operative Delivery    Operative Vaginal Delivery: No                Shoulder Dystocia    Shoulder Dystocia: No       Anesthesia    Method: Epidural              [place] Delivery      Delivery date/time:  25 04:44:00   Delivery type: Normal spontaneous vaginal delivery    Details:     Delivery location: delivery room       Delivery Providers    Delivering Clinician: Clive Alicea DO   Delivery personnel:  Provider Role   Franca Shafer, KALI Baby Nurse   Bri Andrade RN Delivery Nurse             Cord    Vessels: 3 Vessels  Complications: Nuchal  # of loops: 2  Timed cord clamping: Yes  Time in sec: 30  Cord blood disposition: to lab  Gases sent?: Yes       Resuscitation    Method: None       [place] Measurements      Weight: 3220 g 7 lb 1.6 oz Length: 49.5 cm     Head circum.: 34.5 cm Chest circum.: 32 cm      Abdominal circum.: 29 cm           Placenta    Date/time: 2025 04:47  Removal: Spontaneous  Appearance: Intact  Disposition: held for future pathology       Apgars    Living status: Living   Apgar Scoring Key:    0 1 2    Skin color Blue or pale Acrocyanotic Completely pink    Heart rate Absent <100 bpm >100 bpm    Reflex irritability No response Grimace Cry or active withdrawal    Muscle tone Limp  Some flexion Active motion    Respiratory effort Absent Weak cry; hypoventilation Good, crying              1 Minute:  5 Minute:  10 Minute:  15 Minute:  20 Minute:      Skin color: 0  1       Heart rate: 2  2       Reflex irritablity: 2  2       Muscle tone: 2  2       Respiratory effort: 2  2       Total: 8  9          Apgars assigned by: JOSE AHUMADA RN  [place] disposition: with mother       Skin to Skin    Skin to skin initiated date/time: 2025 0509  Skin to skin with: Mother       Vaginal Count    Initial count RN: Bri Andrade RN  Initial count Tech: Irais Adhikari    Initial counts 11   0    Final counts 11   1    Final count RN: Bri Andrade RN  Final count MD: Clive Alicea, DO       Lacerations    Episiotomy: None  Perineal lacerations: 1st Repaired?: Yes     Vaginal laceration?: No      Cervical laceration?: No    Clitoral laceration?: No    Quantitative blood loss (mL): 206

## 2025-06-19 NOTE — ANESTHESIA PROCEDURE NOTES
Labor Analgesia    Date/Time: 6/18/2025 9:17 PM    Performed by: Myerson, David, MD  Authorized by: Myerson, David, MD      General Information and Staff    Start Time:  6/18/2025 9:17 PM  End Time:  6/18/2025 9:28 PM  Anesthesiologist:  Myerson, David, MD  Performed by:  Anesthesiologist  Patient Location:  OB  Site Identification: surface landmarks    Reason for Block: labor epidural    Preanesthetic Checklist: patient identified, IV checked, risks and benefits discussed, monitors and equipment checked, pre-op evaluation, timeout performed, IV bolus, anesthesia consent and sterile technique used      Procedure Details    Patient Position:  Sitting  Prep: ChloraPrep    Monitoring:  Heart rate and continuous pulse ox  Approach:  Midline    Epidural Needle    Injection Technique:  JUNIE air  Needle Type:  Tuohy  Needle Gauge:  17 G  Needle Length:  3.375 in  Needle Insertion Depth:  4.5  Location:  L3-4    Spinal Needle      Catheter    Catheter Type:  End hole  Catheter Size:  19 G  Catheter at Skin Depth:  9  Test Dose:  Negative    Assessment      Additional Comments

## 2025-06-19 NOTE — H&P
OB H&P  29 year old  38w1d with Estimated Date of Delivery: 25.    HPI: Pt presents for SROM last evening.  Pt denies vaginal bleeding or regular contractions. Reports fetal movement.  Patient began prenatal care with [facility] in the 1st trimester.      Prenatal Complications: Problem List[1]    ROS: Patient denies n/v, f/c, HA/dizziness, epigastric pain, visual changes, leg pain, cp/sob.     Prenatal Labs:   Blood type A positive  Rubella immune  VDRL NR  HBsAg neg  HIV neg  GC/CT neg  GBS neg      CBC  Recent Labs   Lab 25   RBC 4.04   HGB 11.0*   HCT 33.6*   MCV 83.2   MCH 27.2   MCHC 32.7   RDW 13.5   NEPRELIM 7.28   WBC 10.1   .0           POBHx:   OB History    Para Term  AB Living   2 0 0 0 0 0   SAB IAB Ectopic Multiple Live Births   0 0 0 0 0      # Outcome Date GA Lbr Georges/2nd Weight Sex Type Anes PTL Lv   2 Current            1                 GynHx: Denies any STDs, denies abnormal paps     PMHx: Past Medical History[2]    Meds: Medications - Current[3]    Allergy: Allergies[4]    SurgHx: Past Surgical History[5]    SocHx: Short Social Hx on File[6]    FamHx: Family History[7]    O:    Vitals:    25 0100   BP: 116/62   Pulse: 80   Resp:    Temp:        GEN: A&OX3, WDWN, NAD   HEENT: NC/AT, Anicteric   CV: RRR +S1, +S2, no m/g/r   PULM:CTABL, no w/r/r   ABD: Gravid, soft, nontender to palpation, +BSX4   EXT: No edema, Neg Yusef's Sign, nontender to palpation     FHT: 135 baseline, mod variability, + accels, - decels   [place]: irregular on admission   SVE: 3-4/80/-2 on admission   SSE: Deferred    A/P:   29 year old  @ 38w1d here for SROM     - Admit to L+D   - CBC, T&S   - IV Fluids= LR @ 125cc/hr  - CEFM/TOCO   - GBS neg  - Start pitocin per protocol  - Fentanyl/epidural PRN  Clive Alicae DO        [1]   Patient Active Problem List  Diagnosis    Migraine without aura, not intractable, without status migrainosus    Imbalance    Vertigo     Cystic fibrosis carrier    Pregnancy (HCC)   [2]   Past Medical History:   Cystic fibrosis carrier    GERD (gastroesophageal reflux disease)    Migraines    Screening for genetic disease carrier status    Carrier Screen = CARRIER for Cystic Fibrosis    Seasonal allergies    TMJ (dislocation of temporomandibular joint)    Vertigo   [3] No current outpatient medications on file.  [4]   Allergies  Allergen Reactions    Seasonal OTHER (SEE COMMENTS)     Congestion, runny nose   [5] History reviewed. No pertinent surgical history.  [6]   Social History  Socioeconomic History    Marital status: Single   Tobacco Use    Smoking status: Never     Passive exposure: Never    Smokeless tobacco: Never    Tobacco comments:     NON-SMOKER   Vaping Use    Vaping status: Never Used   Substance and Sexual Activity    Alcohol use: Not Currently     Alcohol/week: 0.0 standard drinks of alcohol    Drug use: No    Sexual activity: Yes     Partners: Male     Birth control/protection: Condom   Other Topics Concern    Caffeine Concern No     Comment: rare    Exercise No    Seat Belt Yes     Social Drivers of Health     Food Insecurity: No Food Insecurity (6/18/2025)    NCSS - Food Insecurity     Worried About Running Out of Food in the Last Year: No     Ran Out of Food in the Last Year: No   Transportation Needs: No Transportation Needs (6/18/2025)    NCSS - Transportation     Lack of Transportation: No   Stress: No Stress Concern Present (6/18/2025)    Stress     Feeling of Stress : No   Housing Stability: Not At Risk (6/18/2025)    NCSS - Housing/Utilities     Has Housing: Yes     Worried About Losing Housing: No     Unable to Get Utilities: No   [7]   Family History  Problem Relation Age of Onset    Cancer Mother 45        brain cancer    Migraines Mother     Anxiety Mother     Cancer Maternal Grandfather 70        lung    Dementia Paternal Grandmother

## 2025-06-19 NOTE — PLAN OF CARE
Problem: BIRTH - VAGINAL/ SECTION  Goal: Fetal and maternal status remain reassuring during the birth process  Description: INTERVENTIONS:  - Monitor vital signs  - Monitor fetal heart rate  - Monitor uterine activity  - Monitor labor progression (vaginal delivery)  - DVT prophylaxis (C/S delivery)  - Surgical antibiotic prophylaxis (C/S delivery)  Outcome: Progressing     Problem: PAIN - ADULT  Goal: Verbalizes/displays adequate comfort level or patient's stated pain goal  Description: INTERVENTIONS:  - Encourage pt to monitor pain and request assistance  - Assess pain using appropriate pain scale  - Administer analgesics based on type and severity of pain and evaluate response  - Implement non-pharmacological measures as appropriate and evaluate response  - Consider cultural and social influences on pain and pain management  - Manage/alleviate anxiety  - Utilize distraction and/or relaxation techniques  - Monitor for opioid side effects  - Notify MD/LIP if interventions unsuccessful or patient reports new pain  - Anticipate increased pain with activity and pre-medicate as appropriate  Outcome: Progressing     Problem: ANXIETY  Goal: Will report anxiety at manageable levels  Description: INTERVENTIONS:  - Administer medication as ordered  - Teach and rehearse alternative coping skills  - Provide emotional support with 1:1 interaction with staff  Outcome: Progressing     Problem: Patient/Family Goals  Goal: Patient/Family Long Term Goal  Description: Patient's Long Term Goal: Safe delivery of     Interventions:  -   - See additional Care Plan goals for specific interventions  Outcome: Progressing  Goal: Patient/Family Short Term Goal  Description: Patient's Short Term Goal: Adequate pain control    Interventions:   -  - See additional Care Plan goals for specific interventions  Outcome: Progressing     Problem: SAFETY ADULT - FALL  Goal: Free from fall injury  Description: INTERVENTIONS:  - Assess  pt frequently for physical needs  - Identify cognitive and physical deficits and behaviors that affect risk of falls.  - [place] fall precautions as indicated by assessment.  - Educate pt/family on patient safety including physical limitations  - Instruct pt to call for assistance with activity based on assessment  - Modify environment to reduce risk of injury  - Provide assistive devices as appropriate  - Consider OT/PT consult to assist with strengthening/mobility  - Encourage toileting schedule  Outcome: Progressing

## 2025-06-19 NOTE — PROGRESS NOTES
Received patient alert and oriented x4 in MIMI 5 on stretcher, color pink/skin warm and dry. Presents verbalizing spontaneous rupture of membranes at 1650, clear fluid reported. Verbalizes fetal movement. EFM applied, abdomen palpates soft and non-tender, denies pain or discomfort with palpation. Large amount of clear non-malodorous fluid noted on underpad. No edema noted, 2+ DTR's. Patientis a 30 y/o  with an MATTY of 2025= 38 weeks 0 days gestation. Patient states she has seasonal allergies.See admission form and flow sheet for further assessment.

## 2025-06-19 NOTE — PROGRESS NOTES
Patient up to bathroom with assist x 2.  Recent straight catheter @0700. Patient transferred to mother/baby room 1116 per wheelchair in stable condition with baby and personal belongings.  Accompanied by significant other and staff.  Report given to mother/baby RN.

## 2025-06-19 NOTE — PROGRESS NOTES
Phoned Dr. Alicea and informed him of patient status:  SROM, clear fluid noted  Vaginal examination  MD informed unit has no prenatal records on patient. MD states he faxed them to unit. Admission order received.

## 2025-06-20 VITALS
DIASTOLIC BLOOD PRESSURE: 68 MMHG | SYSTOLIC BLOOD PRESSURE: 113 MMHG | OXYGEN SATURATION: 98 % | RESPIRATION RATE: 16 BRPM | TEMPERATURE: 98 F | BODY MASS INDEX: 24.05 KG/M2 | HEART RATE: 81 BPM | WEIGHT: 168 LBS | HEIGHT: 70 IN

## 2025-06-20 LAB
BASOPHILS # BLD AUTO: 0.06 X10(3) UL (ref 0–0.2)
BASOPHILS NFR BLD AUTO: 0.5 %
EOSINOPHIL # BLD AUTO: 0.22 X10(3) UL (ref 0–0.7)
EOSINOPHIL NFR BLD AUTO: 1.7 %
ERYTHROCYTE [DISTWIDTH] IN BLOOD BY AUTOMATED COUNT: 13.9 %
HCT VFR BLD AUTO: 31.4 % (ref 35–48)
HGB BLD-MCNC: 10.3 G/DL (ref 12–16)
IMM GRANULOCYTES # BLD AUTO: 0.05 X10(3) UL (ref 0–1)
IMM GRANULOCYTES NFR BLD: 0.4 %
LYMPHOCYTES # BLD AUTO: 2.29 X10(3) UL (ref 1–4)
LYMPHOCYTES NFR BLD AUTO: 18.1 %
MCH RBC QN AUTO: 27.5 PG (ref 26–34)
MCHC RBC AUTO-ENTMCNC: 32.8 G/DL (ref 31–37)
MCV RBC AUTO: 83.7 FL (ref 80–100)
MONOCYTES # BLD AUTO: 0.86 X10(3) UL (ref 0.1–1)
MONOCYTES NFR BLD AUTO: 6.8 %
NEUTROPHILS # BLD AUTO: 9.16 X10 (3) UL (ref 1.5–7.7)
NEUTROPHILS # BLD AUTO: 9.16 X10(3) UL (ref 1.5–7.7)
NEUTROPHILS NFR BLD AUTO: 72.5 %
PLATELET # BLD AUTO: 214 10(3)UL (ref 150–450)
RBC # BLD AUTO: 3.75 X10(6)UL (ref 3.8–5.3)
WBC # BLD AUTO: 12.6 X10(3) UL (ref 4–11)

## 2025-06-20 PROCEDURE — 85025 COMPLETE CBC W/AUTO DIFF WBC: CPT | Performed by: OBSTETRICS & GYNECOLOGY

## 2025-06-20 RX ORDER — IBUPROFEN 800 MG/1
800 TABLET, FILM COATED ORAL EVERY 8 HOURS PRN
Qty: 30 TABLET | Refills: 3 | Status: SHIPPED | OUTPATIENT
Start: 2025-06-20

## 2025-06-20 NOTE — PLAN OF CARE
Problem: SAFETY ADULT - FALL  Goal: Free from fall injury  Description: INTERVENTIONS:  - Assess pt frequently for physical needs  - Identify cognitive and physical deficits and behaviors that affect risk of falls.  - [place] fall precautions as indicated by assessment.  - Educate pt/family on patient safety including physical limitations  - Instruct pt to call for assistance with activity based on assessment  - Modify environment to reduce risk of injury  - Provide assistive devices as appropriate  - Consider OT/PT consult to assist with strengthening/mobility  - Encourage toileting schedule  Outcome: Adequate for Discharge     Problem: POSTPARTUM  Goal: Long Term Goal:Experiences normal postpartum course  Description: INTERVENTIONS:  - Assess and monitor vital signs and lab values.  - Assess fundus and lochia.  - Provide ice/sitz baths for perineum discomfort.  - Monitor healing of incision/episiotomy/laceration, and assess for signs and symptoms of infection and hematoma.  - Assess bladder function and monitor for bladder distention.  - Provide/instruct/assist with pericare as needed.  - Provide VTE prophylaxis as needed.  - Monitor bowel function.  - Encourage ambulation and provide assistance as needed.  - Assess and monitor emotional status and provide social service/psych resources as needed.  - Utilize standard precautions and use personal protective equipment as indicated. Ensure aseptic care of all intravenous lines and invasive tubes/drains.  - Obtain immunization and exposure to communicable diseases history.  Outcome: Adequate for Discharge  Goal: Optimize infant feeding at the breast  Description: INTERVENTIONS:  - Initiate breast feeding within first hour after birth.   - Monitor effectiveness of current breast feeding efforts.  - Assess support systems available to mother/family.  - Identify cultural beliefs/practices regarding lactation, letdown techniques, maternal food preferences.  - Assess  mother's knowledge and previous experience with breast feeding.  - Provide information as needed about early infant feeding cues (e.g., rooting, lip smacking, sucking fingers/hand) versus late cue of crying.  - Discuss/demonstrate breast feeding aids (e.g., infant sling, nursing footstool/pillows, and breast pumps).  - Encourage mother/other family members to express feelings/concerns, and actively listen.  - Educate father/SO about benefits of breast feeding and how to manage common lactation challenges.  - Recommend avoidance of specific medications or substances incompatible with breast feeding.  - Assess and monitor for signs of nipple pain/trauma.  - Instruct and provide assistance with proper latch.  - Review techniques for milk expression (breast pumping) and storage of breast milk. Provide pumping equipment/supplies, instructions and assistance, as needed.  - Encourage rooming-in and breast feeding on demand.  - Encourage skin-to-skin contact.  - Provide LC support as needed.  - Assess for and manage engorgement.  - Provide breast feeding education handouts and information on community breast feeding support.   Outcome: Adequate for Discharge  Goal: Establishment of adequate milk supply with medication/procedure interruptions  Description: INTERVENTIONS:  - Review techniques for milk expression (breast pumping).   - Provide pumping equipment/supplies, instructions, and assistance until it is safe to breastfeed infant.  Outcome: Adequate for Discharge  Goal: Appropriate maternal -  bonding  Description: INTERVENTIONS:  - Assess caregiver- interactions.  - Assess caregiver's emotional status and coping mechanisms.  - Encourage caregiver to participate in  daily care.  - Assess support systems available to mother/family.  - Provide /case management support as needed.  Outcome: Adequate for Discharge

## 2025-06-20 NOTE — PROGRESS NOTES
Labor Analgesia Follow Up Note    Patient underwent epidural anesthesia for labor analgesia,    Placenta Date/Time: 6/19/2025  4:47 AM    Delivery Date/Time:: 6/19/2025  4:44 AM    /68 (BP Location: Left arm)   Pulse 81   Temp 97.9 °F (36.6 °C) (Oral)   Resp 16   Ht 1.778 m (5' 10\")   Wt 76.2 kg (168 lb)   LMP  (Approximate)   SpO2 98%   Breastfeeding Yes   BMI 24.11 kg/m²     Assessment:  Patient seen and no apparent anesthesia related complications.    Thank you for asking us to participate in the care of your patient.

## 2025-06-20 NOTE — PROGRESS NOTES
S: pt is doing well, cramping pain  O: afeb vss  Abd: soft non tender  A/p: s/p  ppd#1, home today

## 2025-06-24 ENCOUNTER — TELEPHONE (OUTPATIENT)
Dept: OBGYN UNIT | Facility: HOSPITAL | Age: 30
End: 2025-06-24

## (undated) DIAGNOSIS — R73.09 ELEVATED GLUCOSE LEVEL: Primary | ICD-10-CM

## (undated) NOTE — LETTER
Biju Prater, :1995    CONSENT FOR PROCEDURE/SEDATION    1. I authorize the performance upon Biju Prater  the following: Nexplanon Insertion    2.  I authorize Dr. Sejal Montemayor DO (and whomever is designated as the doctor’s ass Relationship to patient: ____________________________________________    Witness: _________________________________________ Date:___________     Physician Signature: _______________________________ Date:___________

## (undated) NOTE — LETTER
[facility] Department of OB/GYN  After Care Instructions for Nexplanon       Bleeding    You may experience irregular bleeding for the first 3-6 months. If your bleeding becomes heavier than a normal menstrual cycle, please contact our office.         Pain   You

## (undated) NOTE — LETTER
24    Re: Renee Paris  : 1995      To Whom It May Concern:  Renee PANTOJA Wendylizeth is under my care and has restrictions to lifting weight 20 pounds or more until further notice.    If you have any questions concerning this letter, please feel free to contact my office.      Sincerely yours,  Renee Hunter MD

## (undated) NOTE — MR AVS SNAPSHOT
After Visit Summary   1/11/2022    Agusto Luna   MRN: [id number]           Visit Information     Date & Time  1/11/2022 11:15 AM Provider  Teresa Castaneda [facility]  [address]  Dept.  Phone  [phone]      Your conditions such as allergies, back pain and cold symptoms? Skip the drive and waiting room and online chat with a doctor face-to-face using your web-cam enabled computer or mobile device wherever you are.  Video Visits cost $50 and can be paid hassle-free u

## (undated) NOTE — LETTER
25    Re: Renee Paris  : 1995    To Whom It May Concern:  Renee Paris is under my care for 25.  Please excuse her from work 25-2/15/25  If you have any questions concerning this letter, please feel free to contact my office.      Sincerely yours,      MIYA Green

## (undated) NOTE — MR AVS SNAPSHOT
[facility], [place] [id number] [facility], [address] [place] [id number] [phone]               Thank you for choosing us for your health care visit with Ginny Stiles DO.   We are glad to serve you and happy to provide you with [facility] breastfeeding and your exam requires an IV Contrast (Gadolinium) injection, you need to stop breastfeeding for 24-48 hours after the procedure.        IF A CHILD is scheduled for this procedure, parents should be advised that they will not be able to accomp ? Patient must present photo ID at time of .     ? Written prescriptions picked up on Fridays must be picked up between the hours of 8:00 AM-1:00 PM.     Scheduling Tests    If your physician has ordered radiology tests such as MRI or CT scans, do no ADVIL 200 MG Tabs   Generic drug:  ibuprofen   Take 200 mg by mouth every 6 (six) hours as needed for Pain. Dicyclomine HCl 20 MG Tabs   Take 1 tablet by mouth daily.    Commonly known as:  BENTYL           Frovatriptan Succinate 2.5 MG Tabs   Ta Visit [facility] online at  [email or web address]

## (undated) NOTE — LETTER
Tyler Carranza, :1995    CONSENT FOR PROCEDURE/SEDATION    1. I authorize the performance upon Tyler Carranza  the following: Removal only Nexplanon    2.  I authorize Dr. Eyvonne Koyanagi,  (and whomever is designated as the doctor’s ____________________________________________    Witness: _________________________________________ Date:___________     Physician Signature: _______________________________ Date:___________

## (undated) NOTE — LETTER
Date: 2/18/2025    Patient Name: Renee Paris          To Whom it may concern:    This letter has been written at the patient's request. The above patient was seen at [facility] for treatment of a medical condition.    This patient should be excused from attending work from 2/19/25 through 3/19/25. With formal paperwork to follow.          Sincerely,        Tari Browning, DO

## (undated) NOTE — LETTER
Date: 2/15/2025    Patient Name: Renee Paris          To Whom it may concern:    This letter has been written at the patient's request. The above patient was seen at [facility] for treatment of a medical condition.    This patient should be excused from attending work/school on 2/14 through 2/18/25.  She may return to work pending medical release after follow up with her primary care provider on 2/18/25.         Sincerely,    Day Bronson PA-C

## (undated) NOTE — LETTER
Date & Time: 2/7/2025, 2:16 PM  Patient: Renee Paris  Encounter Provider(s):    Giovani Chan MD       To Whom It May Concern:    Renee Paris was seen and treated in our department on 2/7/2025. She should not return to work until after evaluation by her OB/GYN next Thursday, February 13, 2025 .    If you have any questions or concerns, please do not hesitate to call.        _____________________________  Physician/APC Signature